# Patient Record
Sex: FEMALE | Race: WHITE | NOT HISPANIC OR LATINO | Employment: FULL TIME | URBAN - METROPOLITAN AREA
[De-identification: names, ages, dates, MRNs, and addresses within clinical notes are randomized per-mention and may not be internally consistent; named-entity substitution may affect disease eponyms.]

---

## 2017-06-05 VITALS
HEART RATE: 96 BPM | HEIGHT: 65 IN | BODY MASS INDEX: 23.46 KG/M2 | DIASTOLIC BLOOD PRESSURE: 62 MMHG | SYSTOLIC BLOOD PRESSURE: 110 MMHG | WEIGHT: 140.81 LBS

## 2017-06-05 RX ORDER — VIT C/E/ZN/COPPR/LUTEIN/ZEAXAN 250MG-90MG
1 CAPSULE ORAL DAILY
COMMUNITY
End: 2019-08-12

## 2017-06-05 RX ORDER — FLUTICASONE PROPIONATE 50 MCG
2 SPRAY, SUSPENSION (ML) NASAL DAILY
COMMUNITY
Start: 2017-03-23 | End: 2019-08-12 | Stop reason: SDUPTHER

## 2017-06-05 RX ORDER — INSULIN LISPRO 100 [IU]/ML
1 INJECTION, SOLUTION INTRAVENOUS; SUBCUTANEOUS DAILY
COMMUNITY
End: 2020-11-03 | Stop reason: SDUPTHER

## 2017-06-06 ENCOUNTER — OFFICE VISIT (OUTPATIENT)
Dept: FAMILY MEDICINE | Facility: CLINIC | Age: 32
End: 2017-06-06
Payer: COMMERCIAL

## 2017-06-06 VITALS
BODY MASS INDEX: 22.99 KG/M2 | HEART RATE: 71 BPM | OXYGEN SATURATION: 99 % | WEIGHT: 138 LBS | SYSTOLIC BLOOD PRESSURE: 130 MMHG | HEIGHT: 65 IN | DIASTOLIC BLOOD PRESSURE: 70 MMHG

## 2017-06-06 DIAGNOSIS — M77.8 SHOULDER TENDONITIS, RIGHT: Primary | ICD-10-CM

## 2017-06-06 PROBLEM — E55.9 UNSPECIFIED VITAMIN D DEFICIENCY: Status: ACTIVE | Noted: 2017-06-06

## 2017-06-06 PROBLEM — Z78.9 NON-SMOKER: Status: ACTIVE | Noted: 2017-06-06

## 2017-06-06 PROCEDURE — 99213 OFFICE O/P EST LOW 20 MIN: CPT | Mod: ,,, | Performed by: NURSE PRACTITIONER

## 2017-06-06 NOTE — PROGRESS NOTES
Subjective:       Patient ID: Marah Auguste is a 31 y.o. female.    Chief Complaint: Shoulder Pain (right)    Shoulder Pain    The pain is present in the right shoulder. This is a recurrent problem. The current episode started more than 1 year ago (about 5 years ago; pt said that she had work up and did physical therapy.  States that she has been Ok since, but over the last couple of months the pain has become more severe and is present every day.). There has been no history of extremity trauma. The problem occurs daily. The problem has been gradually worsening. The quality of the pain is described as aching. The pain is at a severity of 4/10. The pain is moderate. Associated symptoms include a limited range of motion and stiffness. Pertinent negatives include no fever, headaches, inability to bear weight, itching, joint locking, joint swelling, numbness, tingling or visual symptoms. She has tried nothing for the symptoms. Her past medical history is significant for diabetes. There is no history of Injuries to Extremity or migraines.       Review of Systems   Constitutional: Negative for activity change, appetite change, chills, fatigue and fever.   HENT: Negative for congestion, rhinorrhea and sore throat.    Eyes: Negative for pain and visual disturbance.   Respiratory: Negative for cough and shortness of breath.    Cardiovascular: Negative for chest pain and palpitations.   Gastrointestinal: Negative for abdominal pain, constipation and diarrhea.   Endocrine: Negative for polydipsia, polyphagia and polyuria.   Genitourinary: Negative for dysuria, frequency and urgency.   Musculoskeletal: Positive for stiffness. Negative for arthralgias, gait problem and myalgias.   Skin: Negative for color change, itching, pallor and rash.   Allergic/Immunologic: Negative for immunocompromised state.   Neurological: Negative for dizziness, tingling, syncope, numbness and headaches.   Hematological: Negative for adenopathy.  "  Psychiatric/Behavioral: Negative for confusion, self-injury and suicidal ideas.        Past Surgical History:   Procedure Laterality Date    LASER ABLATION      TUBAL LIGATION         Family History   Problem Relation Age of Onset    COPD Mother     Heart disease Mother     Hypertension Mother     Headaches Mother     Depression Mother         Social History     Social History    Marital status: Single     Spouse name: N/A    Number of children: N/A    Years of education: N/A     Occupational History    data entery      Social History Main Topics    Smoking status: Former Smoker    Smokeless tobacco: None    Alcohol use No    Drug use: No    Sexual activity: Yes     Partners: Male     Birth control/ protection: None, See Surgical Hx     Other Topics Concern    None     Social History Narrative    None       Current Outpatient Prescriptions   Medication Sig Dispense Refill    cholecalciferol, vitamin D3, 1,000 unit capsule Take 1 capsule by mouth once daily.      fluticasone (FLONASE) 50 mcg/actuation nasal spray 2 sprays by Nasal route once daily.      insulin lispro (HUMALOG) 100 unit/mL injection Inject 1 mL into the skin once daily.       No current facility-administered medications for this visit.        Review of patient's allergies indicates:  No Known Allergies   Objective:   Blood pressure 130/70, pulse 71, height 5' 5" (1.651 m), weight 62.6 kg (138 lb), SpO2 99 %. Body mass index is 22.96 kg/m².       Physical Exam   Constitutional: She is oriented to person, place, and time. She appears well-developed and well-nourished.   HENT:   Head: Normocephalic and atraumatic.   Right Ear: External ear normal.   Left Ear: External ear normal.   Nose: Nose normal.   Mouth/Throat: Oropharynx is clear and moist.   Eyes: Conjunctivae and lids are normal. Pupils are equal, round, and reactive to light. No scleral icterus.   Neck: Normal range of motion. Neck supple. Carotid bruit is not present. No " thyromegaly present.   Cardiovascular: Normal rate, regular rhythm and normal heart sounds.    Pulmonary/Chest: Effort normal and breath sounds normal.   Abdominal: Soft. Bowel sounds are normal. There is no tenderness.   Musculoskeletal:        Right shoulder: She exhibits tenderness and crepitus. She exhibits no bony tenderness, no swelling and no effusion.        Left shoulder: Normal.   Lymphadenopathy:     She has no cervical adenopathy.   Neurological: She is alert and oriented to person, place, and time.   Skin: Skin is warm, dry and intact.   Psychiatric: She has a normal mood and affect. She expresses no suicidal plans.        Assessment:       1. Shoulder tendonitis, right        Plan:       Marah was seen today for shoulder pain.    Diagnoses and all orders for this visit:    Shoulder tendonitis, right  -     X-ray Shoulder 2 or More Views Right; Future      Physical therapy; Ibuprofen 800mg tid for 3-5 days with food then only daily prn; understanding voiced; MRI if no improvement with PT.

## 2017-06-06 NOTE — PATIENT INSTRUCTIONS
Exercises for Shoulder Flexibility: Wall Walk  Improving your flexibility can reduce pain. Stretching exercises also can help increase your range of pain-free motion. Breathe normally when you exercise. Use smooth, fluid movements.  Note: Follow any special instructions you are given. If you feel pain, stop the exercise. If the pain continues after stopping, call your healthcare provider:  · Stand with your shoulder about 2 feet from the wall.  · Raise your arm to shoulder level and gently walk your fingers up the wall as high as you can.  · Hold for a few seconds. Then walk your fingers back down.  · Repeat 3 times. Move closer to the wall as you repeat.  · Build up to holding each stretch for 30 seconds.  Caution: Do this stretch only if your healthcare provider recommends it. Dont do it when you are first injured.          Date Last Reviewed: 8/16/2015  © 9035-1747 [employer]. [address] [place] [address]. All rights reserved. This information is not intended as a substitute for professional medical care. Always follow your healthcare professional's instructions.        Shoulder and Upper Back Stretch  To start, stand tall with your ears, shoulders, and hips in line. Your feet should be slightly apart, positioned just under your hips. Focus your eyes directly in front of you.  this position for a few seconds before starting your exercise. This helps increase your awareness of proper posture.  Reach overhead and slightly back with both arms. Keep your shoulders and neck aligned and your elbows behind your shoulders:  · With your palms facing the ceiling, turn your fingers inward.  · Take a deep breath. Breathe out, and lower your elbows toward your buttocks. Hold for 5 seconds, then return to starting position.  · Repeat 3 times.       Date Last Reviewed: 8/16/2015  © 5140-7577 [employer]. [address] [place] [address]. All rights reserved. This  information is not intended as a substitute for professional medical care. Always follow your healthcare professional's instructions.        Understanding AC Joint Sprain    The AC (acromioclavicular) joint is where the shoulder blade (scapula) meets the collarbone (clavicle). The highest point of the shoulder blade is called the acromion. Strong tissues called ligaments connect the acromion to the collarbone, forming the AC joint.  An AC joint sprain occurs when an injury damages the ligaments in the AC joint.  What causes AC joint sprain?  Often an accident or injury forces the AC joint apart. This may include:  · Falling onto your shoulder  · Falling onto an outstretched hand  · Getting a direct blow to your shoulder  Symptoms of AC joint sprain  Symptoms can vary depending on how serious the injury is. They can include:  · Shoulder pain  · Shoulder that feels sore when touched  · Swelling  · Bruising  · Change in the shoulders shape  · Bulge above the shoulder  · Shoulder that appears to droop  · Collarbone that moves upward  · Limited movement in the shoulder  Treatment for AC joint sprain  Treatment will depend on how serious the strain is. It will also depend on whether you have damage to other parts of the shoulder. Treatment may include:  · Rest. This allows your shoulder to heal. You should avoid activities that stress the joint. This includes reaching overhead or sleeping on your shoulder.  · Sling. This protects the shoulder and holds the joint in a good position for healing.  · Cold packs. These help reduce swelling and relieve pain.  · Prescription or over-the-counter pain medicines. These help relieve pain and swelling.  · Arm and shoulder exercises. These help keep the shoulder joint mobile as it heals. They also help improve muscle strength around the joint.     When to call your healthcare provider  Call your healthcare provider right away if you have any of these:  · Fever of 100.4°F (38°C) or  higher, or as directed  · Symptoms that dont get better or get worse  · New symptoms   Date Last Reviewed: 3/10/2016  © 8342-2258 [employer], [employer]. [address] [place] [address], [place] [address]. All rights reserved. This information is not intended as a substitute for professional medical care. Always follow your healthcare professional's instructions.

## 2017-07-12 ENCOUNTER — CLINICAL SUPPORT (OUTPATIENT)
Dept: REHABILITATION | Facility: HOSPITAL | Age: 32
End: 2017-07-12
Attending: NURSE PRACTITIONER
Payer: COMMERCIAL

## 2017-07-12 DIAGNOSIS — M77.8 SHOULDER TENDINITIS, UNSPECIFIED LATERALITY: Primary | ICD-10-CM

## 2017-07-12 PROCEDURE — 97161 PT EVAL LOW COMPLEX 20 MIN: CPT | Mod: PN

## 2017-07-12 PROCEDURE — 97140 MANUAL THERAPY 1/> REGIONS: CPT | Mod: PN

## 2017-07-12 NOTE — PLAN OF CARE
TIME RECORD    Date: 07/12/2017    Start Time:  0818  Stop Time:  0908    PROCEDURES:    TIMED  Procedure Time Min.     Manual Therapy Start:  0856  Stop:  0908 12    Start:  Stop:     Start:  Stop:     Start:  Stop:          UNTIMED  Procedure Time Min.     Initial Evaluation Start:  0818  Stop:  0854   34    Start:  Stop:      Total Timed Minutes:  12  Total Timed Units:  1  Total Untimed Units:  1  Charges Billed/# of units:  2    OUTPATIENT PHYSICAL THERAPY   PATIENT EVALUATION    Onset Date: Chronic with flare up about 2-3 months ago.    Primary Diagnosis:   1. Shoulder tendinitis, unspecified laterality       Treatment Diagnosis: R shoulder dysfunction  Past Medical History:   Diagnosis Date    Eustachian tube dysfunction     Vitamin D deficiency      Precautions: Standard  Prior Therapy:  No   Medications: Marah Auguste has a current medication list which includes the following prescription(s): cholecalciferol (vitamin d3), fluticasone, and insulin lispro.  Nutrition:  Normal  History of Present Illness: Pt presents to PT with history of R shoulder dysfunction of several years duration.  She reports that she had her initial episode without known cause about 5 years ago.  Underwent various testing and was told there were 3 small tears which could have been congenital, wear and tear, or traumatic.  Was given exercises to do and symptoms improved significantly.  Was doing well until about 2-3 months ago.  Symptoms returned without known cause and have progressively worsened.  Went to MD who referred her to PT.    Prior Level of Function: Independent  Social History: Lives with  and children.  Performs housekeeping activities, cooking, grocery shopping.  Works full time at a bank.    Place of Residence (Steps/Adaptations): 1 story home .    Functional Deficits Leading to Referral/Nature of Injury: Difficulty with upper body dressing, sleep is disturbed, difficulty getting comfortable. Driving is  "uncomfortable when using R arm.  Intermittent difficulty with using computer mouse.  Difficulty with bathing/hair dressing.   Patient Therapy Goals: To be out of pain.     Subjective     Marah Kathleenhia states "Some days are better than others."    Pain:  Location: R shoulder, intermittently radiating occasionally into R upper arm or into R cervical region.    Description: Intermittent aching with occasional shooting pain.  "Sometimes it feels like it's stuck"  Activities Which Increase Pain: Sitting up straight, reaching with R UE, lifting, upper body dressing, driving, R sidelying   Activities Which Decrease Pain: activity modification, changing positions.   Pain Scale: 3/10 at best 3/10 now  6/10 at worst    Objective     Posture: Standing adequate lumbar lordosis, decreased thoracic kyphosis, R shoulder elevated.  Sitting: minimally reversed lumbar lordosis, increased thoracic kyphosis, moderate rounded shoulder and forward head posture.  R shoulder minimally elevated.    Palpation: Minimal tenderness R anterolateral shoulder but not specific.  Minimal crepitus with scapular scouring.    Sensation: No deficits reported this date.   DTRs:  Not applicable this date.   Range of Motion/Strength:   Shoulder  Right   Left  Pain/Dysfunction with Movement    AROM PROM MMT AROM PROM MMT    flexion  138*  148*  4+/5  WNL   4+/5  active-tight    extension  65*  70*  4+5  WNL   4+/5    abduction  138*  142*  4+/5  WNL   4+/5  active-tight, passive-painful   adduction  30*   40*   4+/5  WNL   4+/5  active-tight, passive-painful   Internal rotation  78*  80*  4+5  WNL    4+/5    ER at 90° abd  78*  80*  4+/5  WM;   4+/5  passive-painful   ER at 0° abd  60*  72*  4+/5     resisted-painful   Otherwise B UE grossly WNL and 4+/5.  Cervical WNL  Flexibility: Tightness B upper traps and levator scap  Gait: Without AD  Analysis: Unremarkable  Bed Mobility:Independent  Transfers: Independent  Special Tests: Joint play: decreased " anterior to posterior glide and superior to inferior glide of R GH joint.  Increased discomfort with AC spin on R.  Scapular mobility WFL with crepitus noted during scouring.    Other: DASH 70/120 = 58.3% disability   Treatment: Educated pt in role of PT and proposed POC.  Verbalized understanding and agreement.  Initiated scapular scouring to R scapulothoracic joint in prone and sidelying with gentle PROM to R shoulder. Pt demonstrated improved AROM following session.      Assessment       Initial Assessment (Pertinent finding, problem list and factors affecting outcome): Pt presents to PT with chronic R shoulder dysfunction that is interfering with her ability to participate in her roles at work, in performing her housework, in taking care of and playing with her children, in driving, completing grocery shopping, and in self care.  Problems include increased shoulder pain, decreased shoulder ROM, impaired posture, decreased scapular mobility, decreased joint play, and decreased upper quadrant flexibility.  She should benefit from skilled PT services to address these limitations in order to resume her roles.    Rehab Potiential: good    Short Term Goals (3 Weeks): 1) Facilitate improved joint play, 2) Facilitate improved upper quadrant flexibility, 3) Improve posture in sitting to adequate lumbar lordosis, normalized thoracic kyphosis, min rounded shoulder and forward head posture, 4) Pt will consistently perform upper body dressing without shoulder pain.   Long Term Goals (6 Weeks): 1) Pt will achieve full ROM of R shoulder in order to resume care and play activities with her children, 2) Pt will resume laying on her R side without discomfort in order to sleep without disturbance from shoulder pain, 3) Pt will consistently drive utilizing B UE without increased R shoulder discomfort, 4) Improve DASH to < 20% disability, 5) Pt will be independent in HEP.      Plan     Certification Period: 07/12/2017 to  08/23/2017  Recommended Treatment Plan: 2 times per week for 6 weeks: Group Therapy, Manual Therapy, Moist Heat/ Ice, Patient Education, Therapeutic Activites, Therapeutic Exercise and Other Therapeutic taping as needed.  Other Recommendations: Pt may also benefit from dry needling PRN for symptom management.       Therapist: Carmen Bautista, PT    I CERTIFY THE NEED FOR THESE SERVICES FURNISHED UNDER THIS PLAN OF TREATMENT AND WHILE UNDER MY CARE    Physician's comments: ________________________________________________________________________________________________________________________________________________      Physician's Name: ___________________________________

## 2017-07-19 ENCOUNTER — CLINICAL SUPPORT (OUTPATIENT)
Dept: REHABILITATION | Facility: HOSPITAL | Age: 32
End: 2017-07-19
Attending: NURSE PRACTITIONER
Payer: COMMERCIAL

## 2017-07-19 DIAGNOSIS — M77.8 SHOULDER TENDINITIS, UNSPECIFIED LATERALITY: ICD-10-CM

## 2017-07-19 PROCEDURE — 97110 THERAPEUTIC EXERCISES: CPT | Mod: PN

## 2017-07-19 NOTE — PROGRESS NOTES
"Name: Marah [facility] Number: [id number]  Date of Treatment: 07/19/2017   Diagnosis:   Encounter Diagnosis   Name Primary?    Shoulder tendinitis, unspecified laterality        Time in: 0805  Time Out: 0905  Total Treatment Time: 60        Subjective:    Marah reports mostly has stiffness in her shoulder.  No pain at present but discomfort.  Patient reports their pain to be 0/10 on a 0-10 scale with 0 being no pain and 10 being the worst pain imaginable.    Objective    Patient received individual therapy to increase strength, endurance, ROM, flexibility and posture with 0 patients with activities as follows:     Marah received therapeutic exercises to develop strength, endurance, ROM, flexibility and posture for 55 minutes including:     UBE: 5 min forward/ 5 min backward  Pulleys: 3 min  Shld Shurgs: x30  Shld Rolls: x30  Scap retraction: x30  Upper Trap Stretch: 3x30 sec B  Levator Stretch: 3x30 sec B  Upper Thoracic Ext Stretch: 3x30 sec  Pec Stretch: 3x30 sec        KT applied to R shoulder for forward shoulder:  KT tape anchored below acromion on R. R UE placed in adduction behind back and neck laterally flexed to L side.  15-25% tension applied to tails over the upper traps, Upper tail applied to occiput and lower tail applied to C3-C4 vertabrae.  0% tension applied at ends.  Additional piece of KT tape, "I" strip applied on anterior aspect of shoulder nader posterior aspect with paper off tension.  "I" strip anchored at inferior border of scapular with 50% tension towards spinal processes in downward motion.  0% tension applied at ends.    Marah received the following manual therapy techniques: Soft tissue Mobilization were applied to the: R Shoulder for 5 minutes.     Pt demo good understanding of the education provided. Marah demonstrated good return demonstration of activities.     Assessment:     Pt had slight increase in pain during PT. Had difficulty raises R UE but loosened after time.  Pt " will continue to benefit from skilled PT intervention. Medical Necessity is demonstrated by:  Continued inability to participate in vocational pursuits, Pain limits function of effected part for some activities, Unable to participate fully in daily activities and Weakness.    Patient is making good progress towards established goals.    Plan:  Continue with established Plan of Care towards PT goals.     Student PTA participated in direct patient care of this patient with 1:1 supervision of PTA.  I certify that I was present in the room directing the student in service delivery and guiding them using my skilled judgment. As the co-signing therapist I have reviewed the students documentation and am responsible for the treatment, assessment, and plan.       Ferny Howe, SPTA/Carrie Doss, PTA

## 2018-02-06 ENCOUNTER — OFFICE VISIT (OUTPATIENT)
Dept: URGENT CARE | Facility: CLINIC | Age: 33
End: 2018-02-06
Payer: COMMERCIAL

## 2018-02-06 VITALS
DIASTOLIC BLOOD PRESSURE: 90 MMHG | RESPIRATION RATE: 18 BRPM | SYSTOLIC BLOOD PRESSURE: 135 MMHG | WEIGHT: 138 LBS | HEART RATE: 75 BPM | HEIGHT: 65 IN | OXYGEN SATURATION: 97 % | TEMPERATURE: 100 F | BODY MASS INDEX: 22.99 KG/M2

## 2018-02-06 DIAGNOSIS — R68.89 FLU-LIKE SYMPTOMS: Primary | ICD-10-CM

## 2018-02-06 DIAGNOSIS — R05.9 COUGH: ICD-10-CM

## 2018-02-06 LAB
CTP QC/QA: YES
FLUAV AG NPH QL: NEGATIVE
FLUBV AG NPH QL: NEGATIVE

## 2018-02-06 PROCEDURE — 87804 INFLUENZA ASSAY W/OPTIC: CPT | Mod: 59,QW,S$GLB, | Performed by: INTERNAL MEDICINE

## 2018-02-06 PROCEDURE — 99203 OFFICE O/P NEW LOW 30 MIN: CPT | Mod: S$GLB,,, | Performed by: INTERNAL MEDICINE

## 2018-02-06 PROCEDURE — 3008F BODY MASS INDEX DOCD: CPT | Mod: S$GLB,,, | Performed by: INTERNAL MEDICINE

## 2018-02-06 NOTE — PROGRESS NOTES
"Subjective:       Patient ID: Marah Auguste is a 32 y.o. female.    Vitals:  height is 5' 5" (1.651 m) and weight is 62.6 kg (138 lb). Her temperature is 99.7 °F (37.6 °C). Her blood pressure is 135/90 (abnormal) and her pulse is 75. Her respiration is 18 and oxygen saturation is 97%.     Chief Complaint: Sinus Problem    Sinus Problem   This is a new problem. The current episode started yesterday. The problem is unchanged. There has been no fever. Associated symptoms include chills, congestion, coughing, ear pain, headaches and sinus pressure. Pertinent negatives include no hoarse voice, shortness of breath or sore throat. Past treatments include nothing.     Review of Systems   Constitution: Positive for chills and malaise/fatigue. Negative for fever.   HENT: Positive for congestion, ear pain and sinus pressure. Negative for hoarse voice and sore throat.    Eyes: Negative for discharge and redness.   Cardiovascular: Negative for chest pain, dyspnea on exertion and leg swelling.   Respiratory: Positive for cough. Negative for shortness of breath, sputum production and wheezing.    Musculoskeletal: Negative for myalgias.   Gastrointestinal: Positive for nausea. Negative for abdominal pain.   Neurological: Positive for headaches.       Objective:      Physical Exam   Constitutional: She is oriented to person, place, and time. She appears well-developed and well-nourished. She is cooperative.  Non-toxic appearance. She does not appear ill. No distress.   HENT:   Head: Normocephalic and atraumatic.   Right Ear: Hearing, tympanic membrane, external ear and ear canal normal.   Left Ear: Hearing, tympanic membrane, external ear and ear canal normal.   Nose: Nose normal. No mucosal edema, rhinorrhea or nasal deformity. No epistaxis. Right sinus exhibits no maxillary sinus tenderness and no frontal sinus tenderness. Left sinus exhibits no maxillary sinus tenderness and no frontal sinus tenderness.   Mouth/Throat: Uvula " is midline, oropharynx is clear and moist and mucous membranes are normal. No trismus in the jaw. Normal dentition. No uvula swelling. No posterior oropharyngeal erythema.   URI Fatigue Flu like Symptoms/Flu test negative   Eyes: Conjunctivae and lids are normal. No scleral icterus.   Sclera clear bilat   Neck: Trachea normal, full passive range of motion without pain and phonation normal. Neck supple.   Cardiovascular: Normal rate, regular rhythm, normal heart sounds, intact distal pulses and normal pulses.    Pulmonary/Chest: Effort normal and breath sounds normal. No respiratory distress. She has no wheezes. She has no rales. She exhibits no tenderness.   Dry Cough   Abdominal: Soft. Normal appearance and bowel sounds are normal. She exhibits no distension. There is no tenderness.   Musculoskeletal: Normal range of motion. She exhibits no edema or deformity.   Neurological: She is alert and oriented to person, place, and time. She exhibits normal muscle tone. Coordination normal.   Skin: Skin is warm, dry and intact. She is not diaphoretic. No pallor.   Psychiatric: She has a normal mood and affect. Her speech is normal and behavior is normal. Judgment and thought content normal. Cognition and memory are normal.   Nursing note and vitals reviewed.      Assessment:       1. Flu-like symptoms    2. Cough        Plan:         Flu-like symptoms  -     POCT Influenza A/B    Cough

## 2018-11-01 ENCOUNTER — OFFICE VISIT (OUTPATIENT)
Dept: URGENT CARE | Facility: CLINIC | Age: 33
End: 2018-11-01
Payer: COMMERCIAL

## 2018-11-01 VITALS
SYSTOLIC BLOOD PRESSURE: 150 MMHG | OXYGEN SATURATION: 98 % | BODY MASS INDEX: 27.09 KG/M2 | RESPIRATION RATE: 18 BRPM | HEART RATE: 64 BPM | DIASTOLIC BLOOD PRESSURE: 91 MMHG | HEIGHT: 60 IN | TEMPERATURE: 98 F | WEIGHT: 138 LBS

## 2018-11-01 DIAGNOSIS — H10.233: Primary | ICD-10-CM

## 2018-11-01 PROCEDURE — 3008F BODY MASS INDEX DOCD: CPT | Mod: CPTII,S$GLB,, | Performed by: PHYSICIAN ASSISTANT

## 2018-11-01 PROCEDURE — 99214 OFFICE O/P EST MOD 30 MIN: CPT | Mod: S$GLB,,, | Performed by: PHYSICIAN ASSISTANT

## 2018-11-01 RX ORDER — INSULIN GLARGINE 100 [IU]/ML
35 INJECTION, SOLUTION SUBCUTANEOUS DAILY
Refills: 6 | COMMUNITY
Start: 2018-10-21 | End: 2020-11-03 | Stop reason: SDUPTHER

## 2018-11-01 NOTE — PATIENT INSTRUCTIONS
Conjunctivitis, Nonspecific    The membrane that covers the white part of your eye (the conjunctiva) is inflamed. Inflammation happens when your body responds to an injury, allergic reaction, infection, or illness. Symptoms of inflammation in the eye may include redness, irritation, itching, swelling, or burning. These symptoms should go away within the next 24 hours. Conjunctivitis may be related to a particle that was in your eye. If so, it may wash out with your tears or irrigation treatment. Being exposed to liquid chemicals or fumes may also cause this reaction.   Home care  · Apply a cold pack (ice in a plastic bag, wrapped in a towel) over the eye for 20 minutes at a time. This will reduce pain.  · Artificial tears may be prescribed to reduce irritation or redness.  These should be used 3 to 4 times a day.  · You may use acetaminophen or ibuprofen to control pain, unless another medicine was prescribed.(Note: If you have chronic liver or kidney disease, or if you have ever had a stomach ulcer or gastrointestinal bleeding, talk with your healthcare provider before using these medicines.)  Follow-up care  Follow up with your healthcare provider, or as advised.  When to seek medical advice  Call your healthcare provider right away if any of these occur:  · Increased eyelid swelling  · Increased eye pain  · Increased redness or drainage from the eye  · Increased blurry vision or increased sensitivity to light  · Failure of normal vision to return within 24 to 48 hours  Date Last Reviewed: 6/14/2015  © 2415-2878 [employer]. [address] [place] [address], [place] [id number]. All rights reserved. This information is not intended as a substitute for professional medical care. Always follow your healthcare professional's instructions.    If you were prescribed a narcotic medication, do not drive or operate heavy equipment or machinery while taking these medications.  You must understand that you've received  an Urgent Care treatment only and that you may be released before all your medical problems are known or treated. You, the patient, will arrange for follow up care as instructed.  Follow up with your PCP or specialty clinic as directed in the next 1-2 weeks if not improved or as needed.  You can call [phone] to schedule an appointment with the appropriate provider.  If your condition worsens we recommend that you receive another evaluation at the emergency room immediately or contact your primary medical clinics after hours call service to discuss your concerns.  Please return here or go to the Emergency Department for any concerns or worsening of condition.

## 2018-11-01 NOTE — PROGRESS NOTES
Subjective:       Patient ID: Marah Auguste is a 33 y.o. female.    Vitals:  height is 5' (1.524 m) and weight is 62.6 kg (138 lb). Her oral temperature is 97.7 °F (36.5 °C). Her blood pressure is 150/91 (abnormal) and her pulse is 64. Her respiration is 18 and oxygen saturation is 98%.     Chief Complaint: Eye Problem    Pt states right eye had crust and today left is itching      Eye Problem    Both eyes are affected.This is a new problem. The current episode started yesterday. The problem occurs constantly. The problem has been gradually worsening. There is no known exposure to pink eye. She does not wear contacts. Associated symptoms include eye redness and itching. Pertinent negatives include no blurred vision, fever, nausea, photophobia or vomiting. She has tried nothing for the symptoms. The treatment provided no relief.     Review of Systems   Constitution: Negative for chills and fever.   HENT: Negative for congestion.    Eyes: Positive for itching and redness. Negative for blurred vision, pain and photophobia.   Gastrointestinal: Negative for nausea and vomiting.   Neurological: Negative for headaches.       Objective:      Physical Exam   Constitutional: She is oriented to person, place, and time. She appears well-developed and well-nourished.   HENT:   Head: Normocephalic and atraumatic.   Right Ear: External ear normal.   Left Ear: External ear normal.   Nose: Nose normal.   Mouth/Throat: Oropharynx is clear and moist.   Eyes: EOM and lids are normal. Pupils are equal, round, and reactive to light. Right eye exhibits no discharge and no exudate. No foreign body present in the right eye. Left eye exhibits no discharge and no exudate. No foreign body present in the left eye. Right conjunctiva is injected. Left conjunctiva is injected.   Neck: Trachea normal, full passive range of motion without pain and phonation normal. Neck supple.   Musculoskeletal: Normal range of motion.   Neurological: She is  alert and oriented to person, place, and time.   Skin: Skin is warm, dry and intact.   Psychiatric: She has a normal mood and affect. Her speech is normal and behavior is normal. Judgment and thought content normal. Cognition and memory are normal.   Nursing note and vitals reviewed.      Assessment:       1. Conjunctivitis, serous non-viral, bilateral        Plan:         Conjunctivitis, serous non-viral, bilateral    declines steroid IM considering DM.   Will use OTC natural tears and RTC if symptoms persist or worsen.     Patient Instructions     Conjunctivitis, Nonspecific    The membrane that covers the white part of your eye (the conjunctiva) is inflamed. Inflammation happens when your body responds to an injury, allergic reaction, infection, or illness. Symptoms of inflammation in the eye may include redness, irritation, itching, swelling, or burning. These symptoms should go away within the next 24 hours. Conjunctivitis may be related to a particle that was in your eye. If so, it may wash out with your tears or irrigation treatment. Being exposed to liquid chemicals or fumes may also cause this reaction.   Home care  · Apply a cold pack (ice in a plastic bag, wrapped in a towel) over the eye for 20 minutes at a time. This will reduce pain.  · Artificial tears may be prescribed to reduce irritation or redness.  These should be used 3 to 4 times a day.  · You may use acetaminophen or ibuprofen to control pain, unless another medicine was prescribed.(Note: If you have chronic liver or kidney disease, or if you have ever had a stomach ulcer or gastrointestinal bleeding, talk with your healthcare provider before using these medicines.)  Follow-up care  Follow up with your healthcare provider, or as advised.  When to seek medical advice  Call your healthcare provider right away if any of these occur:  · Increased eyelid swelling  · Increased eye pain  · Increased redness or drainage from the eye  · Increased  blurry vision or increased sensitivity to light  · Failure of normal vision to return within 24 to 48 hours  Date Last Reviewed: 6/14/2015  © 5096-0138 [employer]. [address] [place] [address], [place] [address]. All rights reserved. This information is not intended as a substitute for professional medical care. Always follow your healthcare professional's instructions.    If you were prescribed a narcotic medication, do not drive or operate heavy equipment or machinery while taking these medications.  You must understand that you've received an Urgent Care treatment only and that you may be released before all your medical problems are known or treated. You, the patient, will arrange for follow up care as instructed.  Follow up with your PCP or specialty clinic as directed in the next 1-2 weeks if not improved or as needed.  You can call [phone] to schedule an appointment with the appropriate provider.  If your condition worsens we recommend that you receive another evaluation at the emergency room immediately or contact your primary medical clinics after hours call service to discuss your concerns.  Please return here or go to the Emergency Department for any concerns or worsening of condition.

## 2018-11-01 NOTE — LETTER
November 1, 2018      [facility] [place] [address], [address]  [facility] [address]  Phone: [phone]  Fax: [phone]       Patient: Marah Auguste   YOB: 1985  Date of Visit: 11/01/2018    To Whom It May Concern:    Delgado Auguste  was at [facility] on 11/01/2018.Please excuse for work/school for 3 days unless well enough to return sooner. If you have any questions or concerns, or if I can be of further assistance, please do not hesitate to contact me.    Sincerely,    Herb Nguyen PA-C

## 2018-11-04 ENCOUNTER — TELEPHONE (OUTPATIENT)
Dept: URGENT CARE | Facility: CLINIC | Age: 33
End: 2018-11-04

## 2019-05-15 ENCOUNTER — HOSPITAL ENCOUNTER (OUTPATIENT)
Dept: RADIOLOGY | Facility: HOSPITAL | Age: 34
Discharge: HOME | End: 2019-05-15
Attending: FAMILY MEDICINE
Payer: COMMERCIAL

## 2019-05-15 ENCOUNTER — OFFICE VISIT (OUTPATIENT)
Dept: URGENT CARE | Facility: CLINIC | Age: 34
End: 2019-05-15
Payer: COMMERCIAL

## 2019-05-15 ENCOUNTER — TELEPHONE (OUTPATIENT)
Dept: URGENT CARE | Facility: CLINIC | Age: 34
End: 2019-05-15

## 2019-05-15 VITALS
TEMPERATURE: 99 F | SYSTOLIC BLOOD PRESSURE: 111 MMHG | HEART RATE: 68 BPM | DIASTOLIC BLOOD PRESSURE: 68 MMHG | BODY MASS INDEX: 23.32 KG/M2 | RESPIRATION RATE: 18 BRPM | WEIGHT: 140 LBS | OXYGEN SATURATION: 100 % | HEIGHT: 65 IN

## 2019-05-15 DIAGNOSIS — R10.30 LOWER ABDOMINAL PAIN: Primary | ICD-10-CM

## 2019-05-15 DIAGNOSIS — R10.30 LOWER ABDOMINAL PAIN: ICD-10-CM

## 2019-05-15 LAB
BILIRUB UR QL STRIP: NEGATIVE
GLUCOSE UR QL STRIP: POSITIVE
KETONES UR QL STRIP: NEGATIVE
LEUKOCYTE ESTERASE UR QL STRIP: NEGATIVE
PH, POC UA: 7.5 (ref 5–8)
POC BLOOD, URINE: NEGATIVE
POC NITRATES, URINE: NEGATIVE
PROT UR QL STRIP: NEGATIVE
SP GR UR STRIP: 1.01 (ref 1–1.03)
UROBILINOGEN UR STRIP-ACNC: NORMAL (ref 0.1–1.1)

## 2019-05-15 PROCEDURE — 76857 US PELIVS LIMITED NON OB: ICD-10-PCS | Mod: 26,,, | Performed by: RADIOLOGY

## 2019-05-15 PROCEDURE — 99214 PR OFFICE/OUTPT VISIT, EST, LEVL IV, 30-39 MIN: ICD-10-PCS | Mod: 25,S$GLB,, | Performed by: FAMILY MEDICINE

## 2019-05-15 PROCEDURE — 99214 OFFICE O/P EST MOD 30 MIN: CPT | Mod: 25,S$GLB,, | Performed by: FAMILY MEDICINE

## 2019-05-15 PROCEDURE — 3008F BODY MASS INDEX DOCD: CPT | Mod: CPTII,S$GLB,, | Performed by: FAMILY MEDICINE

## 2019-05-15 PROCEDURE — 81003 POCT URINALYSIS, DIPSTICK, AUTOMATED, W/O SCOPE: ICD-10-PCS | Mod: QW,S$GLB,, | Performed by: FAMILY MEDICINE

## 2019-05-15 PROCEDURE — 76857 US EXAM PELVIC LIMITED: CPT | Mod: 26,,, | Performed by: RADIOLOGY

## 2019-05-15 PROCEDURE — 3008F PR BODY MASS INDEX (BMI) DOCUMENTED: ICD-10-PCS | Mod: CPTII,S$GLB,, | Performed by: FAMILY MEDICINE

## 2019-05-15 PROCEDURE — 76857 US EXAM PELVIC LIMITED: CPT | Mod: TC

## 2019-05-15 PROCEDURE — 81003 URINALYSIS AUTO W/O SCOPE: CPT | Mod: QW,S$GLB,, | Performed by: FAMILY MEDICINE

## 2019-05-15 RX ORDER — DICYCLOMINE HYDROCHLORIDE 10 MG/1
10 CAPSULE ORAL 2 TIMES DAILY
Qty: 30 CAPSULE | Refills: 0 | Status: SHIPPED | OUTPATIENT
Start: 2019-05-15 | End: 2019-06-14

## 2019-05-15 NOTE — PROGRESS NOTES
"Subjective:       Patient ID: Marah Auguste is a 33 y.o. female.    Vitals:  height is 5' 5" (1.651 m) and weight is 63.5 kg (140 lb). Her oral temperature is 99.4 °F (37.4 °C). Her blood pressure is 111/68 and her pulse is 68. Her respiration is 18 and oxygen saturation is 100%.     Chief Complaint: Bladder Pain    Pressure pain below pelvis area x 2 hours. Patient states pain is tolerable at this moment.    Abdominal Pain   This is a new problem. The current episode started today. The onset quality is sudden. The problem occurs intermittently. The most recent episode lasted 2 hours. The problem has been gradually improving. The pain is located in the LLQ, RLQ and suprapubic region. The pain is at a severity of 3/10. The quality of the pain is dull. The abdominal pain radiates to the LLQ, RLQ and pelvis. Associated symptoms include headaches and nausea. Pertinent negatives include no dysuria, fever, frequency, hematuria or vomiting. The pain is relieved by nothing. She has tried nothing for the symptoms.       Constitution: Positive for chills and sweating. Negative for fever.   Neck: Negative for painful lymph nodes.   Gastrointestinal: Positive for abdominal pain and nausea. Negative for vomiting.   Genitourinary: Negative for dysuria, frequency, urgency, urine decreased, hematuria, history of kidney stones, painful menstruation, irregular menstruation, missed menses, heavy menstrual bleeding, ovarian cysts, genital trauma, vaginal pain, vaginal discharge, vaginal bleeding, vaginal odor, painful intercourse, genital sore, painful ejaculation and pelvic pain.   Musculoskeletal: Positive for back pain.   Skin: Negative for rash and lesion.   Neurological: Positive for headaches.   Hematologic/Lymphatic: Negative for swollen lymph nodes.       Objective:      Physical Exam   Constitutional: She is oriented to person, place, and time. She appears well-developed and well-nourished.   HENT:   Head: Normocephalic " and atraumatic.   Right Ear: External ear normal.   Left Ear: External ear normal.   Nose: Nose normal.   Mouth/Throat: Mucous membranes are normal.   Eyes: Conjunctivae and lids are normal.   Neck: Trachea normal and full passive range of motion without pain. Neck supple.   Cardiovascular: Normal rate, regular rhythm and normal heart sounds.   Pulmonary/Chest: Effort normal and breath sounds normal. No respiratory distress.   Abdominal: Soft. Normal appearance and bowel sounds are normal. She exhibits no distension, no abdominal bruit, no pulsatile midline mass and no mass. There is tenderness. There is rebound.       Musculoskeletal: Normal range of motion. She exhibits no edema.   Neurological: She is alert and oriented to person, place, and time. She has normal strength.   Skin: Skin is warm, dry and intact. She is not diaphoretic. No pallor.   Psychiatric: She has a normal mood and affect. Her speech is normal and behavior is normal. Judgment and thought content normal. Cognition and memory are normal.   Nursing note and vitals reviewed.      Assessment:       1. Lower abdominal pain        Plan:         Lower abdominal pain  -     US Abdomen Limited; Future; Expected date: 05/15/2019  -     POCT Urinalysis, Dipstick, Automated, W/O Scope    Other orders  -     dicyclomine (BENTYL) 10 MG capsule; Take 1 capsule (10 mg total) by mouth 2 (two) times daily.  Dispense: 30 capsule; Refill: 0        advised ER precautions. Pt v/u. US at 445 today at [facility]

## 2019-08-12 PROBLEM — R05.9 COUGH: Status: RESOLVED | Noted: 2018-02-06 | Resolved: 2019-08-12

## 2019-08-12 PROBLEM — Z78.9 NON-SMOKER: Status: RESOLVED | Noted: 2017-06-06 | Resolved: 2019-08-12

## 2019-08-12 PROBLEM — R68.89 FLU-LIKE SYMPTOMS: Status: RESOLVED | Noted: 2018-02-06 | Resolved: 2019-08-12

## 2019-10-15 ENCOUNTER — OFFICE VISIT (OUTPATIENT)
Dept: URGENT CARE | Facility: CLINIC | Age: 34
End: 2019-10-15
Payer: COMMERCIAL

## 2019-10-15 VITALS
HEIGHT: 65 IN | DIASTOLIC BLOOD PRESSURE: 88 MMHG | OXYGEN SATURATION: 100 % | WEIGHT: 142 LBS | TEMPERATURE: 98 F | HEART RATE: 69 BPM | SYSTOLIC BLOOD PRESSURE: 142 MMHG | RESPIRATION RATE: 18 BRPM | BODY MASS INDEX: 23.66 KG/M2

## 2019-10-15 DIAGNOSIS — J32.9 CLINICAL SINUSITIS: Primary | ICD-10-CM

## 2019-10-15 PROCEDURE — 3008F PR BODY MASS INDEX (BMI) DOCUMENTED: ICD-10-PCS | Mod: CPTII,S$GLB,, | Performed by: FAMILY MEDICINE

## 2019-10-15 PROCEDURE — 96372 THER/PROPH/DIAG INJ SC/IM: CPT | Mod: S$GLB,,, | Performed by: FAMILY MEDICINE

## 2019-10-15 PROCEDURE — 3008F BODY MASS INDEX DOCD: CPT | Mod: CPTII,S$GLB,, | Performed by: FAMILY MEDICINE

## 2019-10-15 PROCEDURE — 96372 PR INJECTION,THERAP/PROPH/DIAG2ST, IM OR SUBCUT: ICD-10-PCS | Mod: S$GLB,,, | Performed by: FAMILY MEDICINE

## 2019-10-15 PROCEDURE — 99214 PR OFFICE/OUTPT VISIT, EST, LEVL IV, 30-39 MIN: ICD-10-PCS | Mod: 25,S$GLB,, | Performed by: FAMILY MEDICINE

## 2019-10-15 PROCEDURE — 99214 OFFICE O/P EST MOD 30 MIN: CPT | Mod: 25,S$GLB,, | Performed by: FAMILY MEDICINE

## 2019-10-15 RX ORDER — BETAMETHASONE SODIUM PHOSPHATE AND BETAMETHASONE ACETATE 3; 3 MG/ML; MG/ML
6 INJECTION, SUSPENSION INTRA-ARTICULAR; INTRALESIONAL; INTRAMUSCULAR; SOFT TISSUE ONCE
Status: COMPLETED | OUTPATIENT
Start: 2019-10-15 | End: 2019-10-15

## 2019-10-15 RX ORDER — CEFDINIR 300 MG/1
300 CAPSULE ORAL 2 TIMES DAILY
Qty: 20 CAPSULE | Refills: 0 | Status: SHIPPED | OUTPATIENT
Start: 2019-10-15 | End: 2019-10-25

## 2019-10-15 RX ORDER — AZELASTINE 1 MG/ML
1 SPRAY, METERED NASAL 2 TIMES DAILY
Qty: 30 ML | Refills: 3 | Status: SHIPPED | OUTPATIENT
Start: 2019-10-15 | End: 2020-07-07

## 2019-10-15 RX ORDER — FLUTICASONE PROPIONATE 50 MCG
1 SPRAY, SUSPENSION (ML) NASAL 2 TIMES DAILY
Qty: 1 BOTTLE | Refills: 2 | Status: SHIPPED | OUTPATIENT
Start: 2019-10-15 | End: 2020-07-07

## 2019-10-15 RX ADMIN — BETAMETHASONE SODIUM PHOSPHATE AND BETAMETHASONE ACETATE 6 MG: 3; 3 INJECTION, SUSPENSION INTRA-ARTICULAR; INTRALESIONAL; INTRAMUSCULAR; SOFT TISSUE at 09:10

## 2019-10-15 NOTE — PROGRESS NOTES
"Subjective:       Patient ID: Marah Robert is a 34 y.o. female.    Vitals:  height is 5' 5" (1.651 m) and weight is 64.4 kg (142 lb). Her oral temperature is 98.3 °F (36.8 °C). Her blood pressure is 142/88 (abnormal) and her pulse is 69. Her respiration is 18 and oxygen saturation is 100%.     Chief Complaint: Sinus Problem    Post nasal drip x 1 month. Patient states it has her ears plugged up now. Patient states she has a little cough which feels like it is in her chest.    Sinus Problem   This is a new problem. The current episode started 1 to 4 weeks ago. The problem has been waxing and waning since onset. Associated symptoms include congestion, coughing, ear pain, headaches, sinus pressure and a sore throat. Pertinent negatives include no chills, diaphoresis or shortness of breath. Past treatments include acetaminophen (mucinex amd angelia selzer).       Constitution: Negative for chills, sweating, fatigue and fever.   HENT: Positive for ear pain, congestion, postnasal drip, sinus pain, sinus pressure and sore throat. Negative for voice change.    Neck: Negative for painful lymph nodes.   Eyes: Negative for eye redness.   Respiratory: Positive for cough. Negative for chest tightness, sputum production, bloody sputum, COPD, shortness of breath, stridor, wheezing and asthma.    Gastrointestinal: Negative for nausea and vomiting.   Musculoskeletal: Negative for muscle ache.   Skin: Negative for rash.   Allergic/Immunologic: Negative for seasonal allergies and asthma.   Neurological: Positive for headaches.   Hematologic/Lymphatic: Negative for swollen lymph nodes.       Objective:      Physical Exam   Constitutional: She is oriented to person, place, and time. She appears well-developed and well-nourished. She is cooperative.  Non-toxic appearance. She does not have a sickly appearance. She does not appear ill. No distress.   HENT:   Head: Normocephalic and atraumatic.   Right Ear: Hearing, tympanic membrane, " external ear and ear canal normal.   Left Ear: Hearing, tympanic membrane, external ear and ear canal normal.   Nose: Mucosal edema and rhinorrhea present. No nasal deformity. No epistaxis. Right sinus exhibits maxillary sinus tenderness. Right sinus exhibits no frontal sinus tenderness. Left sinus exhibits maxillary sinus tenderness. Left sinus exhibits no frontal sinus tenderness.   Mouth/Throat: Uvula is midline, oropharynx is clear and moist and mucous membranes are normal. No trismus in the jaw. Normal dentition. No uvula swelling. No oropharyngeal exudate, posterior oropharyngeal edema or posterior oropharyngeal erythema.   Eyes: Conjunctivae and lids are normal. No scleral icterus.   Neck: Trachea normal, full passive range of motion without pain and phonation normal. Neck supple. No neck rigidity. No edema and no erythema present.   Cardiovascular: Normal rate, regular rhythm, normal heart sounds, intact distal pulses and normal pulses.   Pulmonary/Chest: Effort normal and breath sounds normal. No respiratory distress. She has no decreased breath sounds. She has no rhonchi.   Abdominal: Normal appearance.   Musculoskeletal: Normal range of motion. She exhibits no edema or deformity.   Neurological: She is alert and oriented to person, place, and time. She exhibits normal muscle tone. Coordination normal.   Skin: Skin is warm, dry, intact, not diaphoretic and not pale.   Psychiatric: She has a normal mood and affect. Her speech is normal and behavior is normal. Judgment and thought content normal. Cognition and memory are normal.   Nursing note and vitals reviewed.        Assessment:       No diagnosis found.    Plan:         There are no diagnoses linked to this encounter.

## 2020-05-19 ENCOUNTER — TELEPHONE (OUTPATIENT)
Dept: URGENT CARE | Facility: CLINIC | Age: 35
End: 2020-05-19

## 2020-05-19 NOTE — TELEPHONE ENCOUNTER
Saw patient on [facility]. Called to check on her to see if she is improving with Lamisil cream. No answer, left voicemail.    Abi Menchaca PA-C

## 2020-05-19 NOTE — TELEPHONE ENCOUNTER
----- Message from Leisa Alcaraz MD sent at 5/19/2020  1:37 PM CDT -----  I looked at the [facility] chart - not sure this is simple ring worm with >4 lesions and spread apart- consider nummular eczema, or psoriatic rash - perhaps call patient and check and have her see derm

## 2020-07-07 PROBLEM — E10.65 UNCONTROLLED TYPE 1 DIABETES MELLITUS WITH HYPERGLYCEMIA: Status: ACTIVE | Noted: 2020-07-07

## 2020-07-16 ENCOUNTER — LAB VISIT (OUTPATIENT)
Dept: PRIMARY CARE CLINIC | Facility: OTHER | Age: 35
End: 2020-07-16
Attending: INTERNAL MEDICINE
Payer: COMMERCIAL

## 2020-07-16 DIAGNOSIS — R05.9 COUGH: ICD-10-CM

## 2020-07-16 DIAGNOSIS — Z03.818 ENCOUNTER FOR OBSERVATION FOR SUSPECTED EXPOSURE TO OTHER BIOLOGICAL AGENTS RULED OUT: ICD-10-CM

## 2020-07-16 PROCEDURE — U0003 INFECTIOUS AGENT DETECTION BY NUCLEIC ACID (DNA OR RNA); SEVERE ACUTE RESPIRATORY SYNDROME CORONAVIRUS 2 (SARS-COV-2) (CORONAVIRUS DISEASE [COVID-19]), AMPLIFIED PROBE TECHNIQUE, MAKING USE OF HIGH THROUGHPUT TECHNOLOGIES AS DESCRIBED BY CMS-2020-01-R: HCPCS | Mod: ST72

## 2020-07-20 LAB — SARS-COV-2 RNA RESP QL NAA+PROBE: NEGATIVE

## 2020-10-15 ENCOUNTER — OFFICE VISIT (OUTPATIENT)
Dept: FAMILY MEDICINE | Facility: CLINIC | Age: 35
End: 2020-10-15
Payer: COMMERCIAL

## 2020-10-15 ENCOUNTER — HOSPITAL ENCOUNTER (OUTPATIENT)
Dept: RADIOLOGY | Facility: HOSPITAL | Age: 35
Discharge: HOME | End: 2020-10-15
Attending: NURSE PRACTITIONER
Payer: COMMERCIAL

## 2020-10-15 VITALS
WEIGHT: 143 LBS | HEART RATE: 72 BPM | DIASTOLIC BLOOD PRESSURE: 74 MMHG | HEIGHT: 63 IN | BODY MASS INDEX: 25.34 KG/M2 | TEMPERATURE: 99 F | SYSTOLIC BLOOD PRESSURE: 118 MMHG

## 2020-10-15 DIAGNOSIS — E10.65 UNCONTROLLED TYPE 1 DIABETES MELLITUS WITH HYPERGLYCEMIA: Primary | ICD-10-CM

## 2020-10-15 DIAGNOSIS — M25.559 HIP PAIN: ICD-10-CM

## 2020-10-15 DIAGNOSIS — F41.9 ANXIETY: ICD-10-CM

## 2020-10-15 DIAGNOSIS — F32.A DEPRESSION, UNSPECIFIED DEPRESSION TYPE: ICD-10-CM

## 2020-10-15 DIAGNOSIS — E55.9 VITAMIN D DEFICIENCY: ICD-10-CM

## 2020-10-15 DIAGNOSIS — G47.00 INSOMNIA, UNSPECIFIED TYPE: ICD-10-CM

## 2020-10-15 PROCEDURE — 73502 X-RAY EXAM HIP UNI 2-3 VIEWS: CPT | Mod: TC,PO,RT

## 2020-10-15 PROCEDURE — 3052F HG A1C>EQUAL 8.0%<EQUAL 9.0%: CPT | Mod: S$GLB,,, | Performed by: NURSE PRACTITIONER

## 2020-10-15 PROCEDURE — 3008F PR BODY MASS INDEX (BMI) DOCUMENTED: ICD-10-PCS | Mod: S$GLB,,, | Performed by: NURSE PRACTITIONER

## 2020-10-15 PROCEDURE — 3052F PR MOST RECENT HEMOGLOBIN A1C LEVEL 8.0 - < 9.0%: ICD-10-PCS | Mod: S$GLB,,, | Performed by: NURSE PRACTITIONER

## 2020-10-15 PROCEDURE — 99204 OFFICE O/P NEW MOD 45 MIN: CPT | Mod: S$GLB,,, | Performed by: NURSE PRACTITIONER

## 2020-10-15 PROCEDURE — 99204 PR OFFICE/OUTPT VISIT, NEW, LEVL IV, 45-59 MIN: ICD-10-PCS | Mod: S$GLB,,, | Performed by: NURSE PRACTITIONER

## 2020-10-15 PROCEDURE — 83036 HEMOGLOBIN GLYCOSYLATED A1C: CPT | Mod: QW,,, | Performed by: NURSE PRACTITIONER

## 2020-10-15 PROCEDURE — 83036 POCT HEMOGLOBIN A1C: ICD-10-PCS | Mod: QW,,, | Performed by: NURSE PRACTITIONER

## 2020-10-15 PROCEDURE — 3008F BODY MASS INDEX DOCD: CPT | Mod: S$GLB,,, | Performed by: NURSE PRACTITIONER

## 2020-10-15 RX ORDER — TEMAZEPAM 15 MG/1
15 CAPSULE ORAL NIGHTLY PRN
Qty: 30 CAPSULE | Refills: 1 | Status: SHIPPED | OUTPATIENT
Start: 2020-10-15 | End: 2020-11-14

## 2020-10-15 RX ORDER — ESCITALOPRAM OXALATE 10 MG/1
10 TABLET ORAL DAILY
Qty: 30 TABLET | Refills: 11 | Status: SHIPPED | OUTPATIENT
Start: 2020-10-15 | End: 2020-11-11

## 2020-10-15 RX ORDER — DESOGESTREL AND ETHINYL ESTRADIOL 0.15-0.03
KIT ORAL
COMMUNITY
Start: 2020-10-14 | End: 2023-01-30

## 2020-10-15 NOTE — PROGRESS NOTES
SUBJECTIVE:    Patient ID: Marah Robert is a 35 y.o. female.    Chief Complaint: Establish Care (brought bottles//refused flu shot tb )    35 year old female presents for check up and to establish care. She was diagnosed with type 1 diabetes around the age of 23. Does not watch diet. Sugars are up and down. Previously had pump but not able to afford. since insurance as changed. .    States under care Dr Ramos endocrinologist. Used to have Insulin pump but now using Basalgar 35 U and Regular insulin on SSI  No recent labs. Does not sleep well. Currently seeing therapist. Lots of stressors.   Pap smear is up to date.  Reviewed medications list.  Has been having right hip pain. Denies injury or trauma      Office Visit on 10/15/2020   Component Date Value Ref Range Status    Hemoglobin A1C 10/16/2020 8.5  % Final   Lab Visit on 07/16/2020   Component Date Value Ref Range Status    SARS-CoV-2 RNA, Amplification, Qual 07/16/2020 Negative   Final   Lab Visit on 07/08/2020   Component Date Value Ref Range Status    SARS-CoV2 (COVID-19) Qualitative P* 07/08/2020 Not Detected   Final       Past Medical History:   Diagnosis Date    Diabetes mellitus type I     Eustachian tube dysfunction     Vitamin D deficiency      Social History     Socioeconomic History    Marital status:      Spouse name: Not on file    Number of children: Not on file    Years of education: Not on file    Highest education level: Not on file   Occupational History    Occupation: data entery   Social Needs    Financial resource strain: Not on file    Food insecurity     Worry: Not on file     Inability: Not on file    Transportation needs     Medical: Not on file     Non-medical: Not on file   Tobacco Use    Smoking status: Former Smoker    Smokeless tobacco: Never Used   Substance and Sexual Activity    Alcohol use: No    Drug use: No    Sexual activity: Yes     Partners: Male     Birth control/protection: None, See  Surgical Hx   Lifestyle    Physical activity     Days per week: Not on file     Minutes per session: Not on file    Stress: Not on file   Relationships    Social connections     Talks on phone: Not on file     Gets together: Not on file     Attends Sabianist service: Not on file     Active member of club or organization: Not on file     Attends meetings of clubs or organizations: Not on file     Relationship status: Not on file   Other Topics Concern    Not on file   Social History Narrative    Not on file     Past Surgical History:   Procedure Laterality Date    ABLATION      LASER ABLATION      TUBAL LIGATION       Family History   Problem Relation Age of Onset    COPD Mother     Heart disease Mother     Hypertension Mother     Headaches Mother     Depression Mother     No Known Problems Father        Review of patient's allergies indicates:   Allergen Reactions    Lisinopril        Current Outpatient Medications:     BASAGLAR KWIKPEN U-100 INSULIN 100 unit/mL (3 mL) InPn pen, Inject 35 Units into the skin once daily., Disp: , Rfl: 6    ENSKYCE 0.15-0.03 mg per tablet, , Disp: , Rfl:     insulin lispro (HUMALOG) 100 unit/mL injection, Inject 1 mL into the skin once daily., Disp: , Rfl:     ONETOUCH ULTRA BLUE TEST STRIP Strp, TEST 4 TIMES DAILY, Disp: , Rfl:     escitalopram oxalate (LEXAPRO) 10 MG tablet, Take 1 tablet (10 mg total) by mouth once daily., Disp: 30 tablet, Rfl: 11    temazepam (RESTORIL) 15 mg Cap, Take 1 capsule (15 mg total) by mouth nightly as needed., Disp: 30 capsule, Rfl: 1    Review of Systems   Constitutional: Negative for chills, fever and unexpected weight change.   HENT: Negative for ear pain, rhinorrhea and sore throat.    Eyes: Negative for pain and visual disturbance.   Respiratory: Negative for cough and shortness of breath.    Cardiovascular: Negative for chest pain, palpitations and leg swelling.   Gastrointestinal: Negative for abdominal pain, diarrhea, nausea  "and vomiting.   Genitourinary: Negative for difficulty urinating, hematuria and vaginal bleeding.   Musculoskeletal: Negative for arthralgias.        Hip pain   Skin: Negative for rash.   Neurological: Negative for dizziness, weakness and headaches.   Psychiatric/Behavioral: Positive for sleep disturbance. Negative for agitation. The patient is nervous/anxious.           Objective:      Vitals:    10/15/20 1208   BP: 118/74   Pulse: 72   Temp: 98.7 °F (37.1 °C)   Weight: 64.9 kg (143 lb)   Height: 5' 3" (1.6 m)     Physical Exam  Constitutional:       Appearance: She is well-developed.   HENT:      Right Ear: External ear normal.      Left Ear: External ear normal.   Eyes:      Conjunctiva/sclera: Conjunctivae normal.      Pupils: Pupils are equal, round, and reactive to light.   Neck:      Musculoskeletal: Normal range of motion and neck supple.      Vascular: No JVD.   Cardiovascular:      Rate and Rhythm: Normal rate and regular rhythm.      Heart sounds: No murmur.   Pulmonary:      Effort: Pulmonary effort is normal.      Breath sounds: Normal breath sounds.   Abdominal:      General: Bowel sounds are normal.      Palpations: Abdomen is soft.   Musculoskeletal:         General: No deformity.      Right hip: She exhibits decreased range of motion. She exhibits normal strength and no tenderness.   Feet:      Right foot:      Protective Sensation: 5 sites tested. 5 sites sensed.      Left foot:      Protective Sensation: 5 sites tested. 5 sites sensed.   Lymphadenopathy:      Cervical: No cervical adenopathy.   Skin:     General: Skin is warm and dry.      Findings: No rash.   Neurological:      Mental Status: She is alert and oriented to person, place, and time.      Gait: Gait normal.   Psychiatric:         Speech: Speech normal.         Behavior: Behavior normal.           Assessment:       1. Uncontrolled type 1 diabetes mellitus with hyperglycemia    2. Vitamin D deficiency    3. Anxiety    4. Depression, " unspecified depression type    5. Insomnia, unspecified type    6. Hip pain         Plan:       Uncontrolled type 1 diabetes mellitus with hyperglycemia  -     POCT HEMOGLOBIN A1C  -     Ambulatory referral/consult to Endocrinology; Future; Expected date: 10/22/2020    Vitamin D deficiency    Anxiety  -     escitalopram oxalate (LEXAPRO) 10 MG tablet; Take 1 tablet (10 mg total) by mouth once daily.  Dispense: 30 tablet; Refill: 11    Depression, unspecified depression type  -     escitalopram oxalate (LEXAPRO) 10 MG tablet; Take 1 tablet (10 mg total) by mouth once daily.  Dispense: 30 tablet; Refill: 11    Insomnia, unspecified type  -     temazepam (RESTORIL) 15 mg Cap; Take 1 capsule (15 mg total) by mouth nightly as needed.  Dispense: 30 capsule; Refill: 1    Hip pain  -     X-Ray Hip 2 or 3 views Right; Future      Follow up in about 4 weeks (around 11/12/2020) for medication management.        10/18/2020 Aleja Bashir

## 2020-10-16 LAB — HBA1C MFR BLD: 8.5 %

## 2020-10-19 ENCOUNTER — TELEPHONE (OUTPATIENT)
Dept: FAMILY MEDICINE | Facility: CLINIC | Age: 35
End: 2020-10-19

## 2020-10-19 DIAGNOSIS — E55.9 VITAMIN D DEFICIENCY: ICD-10-CM

## 2020-10-19 DIAGNOSIS — Z79.899 HIGH RISK MEDICATION USE: ICD-10-CM

## 2020-10-19 DIAGNOSIS — E10.65 UNCONTROLLED TYPE 1 DIABETES MELLITUS WITH HYPERGLYCEMIA: Primary | ICD-10-CM

## 2020-10-19 DIAGNOSIS — F32.A DEPRESSION, UNSPECIFIED DEPRESSION TYPE: ICD-10-CM

## 2020-10-19 NOTE — TELEPHONE ENCOUNTER
Spoke to pt regarding message below. Pt stated she started Lexapro 10 mg . She isn't able to eat anything and that she keeps running to the restroom. Pt wanted to know if Aleja could [facility] in her [facility]

## 2020-10-19 NOTE — TELEPHONE ENCOUNTER
----- Message from Nereida Galvan sent at 10/19/2020  9:36 AM CDT -----   9:04   The patient saw Aleja last week. She put her on some medication and she is having some side effects. Please call   pt's #  [phone]

## 2020-10-19 NOTE — TELEPHONE ENCOUNTER
Spoke to pt regarding message below. Pt verbalized understanding. Pt stated she is only taking the 5mg of the lexapro

## 2020-10-19 NOTE — TELEPHONE ENCOUNTER
Spoke to pt regarding message below. Pt stated she didn't take the med this morning, but she has been having  Diarrhea. Pt is wanting to switch to different med

## 2020-10-20 RX ORDER — CITALOPRAM 10 MG/1
10 TABLET ORAL DAILY
Qty: 30 TABLET | Refills: 11 | Status: SHIPPED | OUTPATIENT
Start: 2020-10-20 | End: 2020-12-03 | Stop reason: SDUPTHER

## 2020-10-20 NOTE — TELEPHONE ENCOUNTER
Spoke to pt regarding message below. Pt verbalized understanding. I was able to get pt scheduled on 11/18

## 2020-11-03 NOTE — TELEPHONE ENCOUNTER
----- Message from Sarah Bustillo sent at 11/3/2020  8:31 AM CST -----  Regarding: refills and referral  Refill on BASAGLAR KWIKPEN U-100 INSULIN 100 unit/mL (3 mL) InPn pen   Hemolog kwikpen   Pharm [facility]    And referral to the endocrinologist   Pt [phone]

## 2020-11-04 RX ORDER — INSULIN LISPRO 100 [IU]/ML
100 INJECTION, SOLUTION INTRAVENOUS; SUBCUTANEOUS DAILY
Qty: 10 ML | Refills: 3 | Status: SHIPPED | OUTPATIENT
Start: 2020-11-04 | End: 2020-11-11

## 2020-11-04 RX ORDER — INSULIN GLARGINE 100 [IU]/ML
35 INJECTION, SOLUTION SUBCUTANEOUS DAILY
Qty: 3 ML | Refills: 3 | Status: SHIPPED | OUTPATIENT
Start: 2020-11-04 | End: 2021-01-05 | Stop reason: SDUPTHER

## 2020-11-11 ENCOUNTER — OFFICE VISIT (OUTPATIENT)
Dept: ENDOCRINOLOGY | Facility: CLINIC | Age: 35
End: 2020-11-11
Payer: COMMERCIAL

## 2020-11-11 ENCOUNTER — TELEPHONE (OUTPATIENT)
Dept: FAMILY MEDICINE | Facility: CLINIC | Age: 35
End: 2020-11-11

## 2020-11-11 VITALS
HEART RATE: 75 BPM | BODY MASS INDEX: 25.45 KG/M2 | WEIGHT: 143.63 LBS | TEMPERATURE: 98 F | DIASTOLIC BLOOD PRESSURE: 70 MMHG | SYSTOLIC BLOOD PRESSURE: 104 MMHG | HEIGHT: 63 IN

## 2020-11-11 DIAGNOSIS — E10.65 UNCONTROLLED TYPE 1 DIABETES MELLITUS WITH HYPERGLYCEMIA: Primary | ICD-10-CM

## 2020-11-11 DIAGNOSIS — F32.A DEPRESSION, UNSPECIFIED DEPRESSION TYPE: ICD-10-CM

## 2020-11-11 DIAGNOSIS — Z82.49 FAMILY HISTORY OF HEART DISEASE: ICD-10-CM

## 2020-11-11 LAB
1,25(OH)2D SERPL-MCNC: 63 PG/ML
1,25(OH)2D2 SERPL-MCNC: <10 PG/ML
1,25(OH)2D3 SERPL-MCNC: 62 PG/ML
ALBUMIN SERPL-MCNC: 3.8 G/DL (ref 3.8–4.8)
ALBUMIN/CREAT UR: <7 MG/G CREAT (ref 0–29)
ALBUMIN/GLOB SERPL: 1.3 {RATIO} (ref 1.2–2.2)
ALP SERPL-CCNC: 60 IU/L (ref 39–117)
ALT SERPL-CCNC: 14 IU/L (ref 0–32)
APPEARANCE UR: CLEAR
AST SERPL-CCNC: 14 IU/L (ref 0–40)
BACTERIA #/AREA URNS HPF: NORMAL /[HPF]
BASOPHILS # BLD AUTO: 0 X10E3/UL (ref 0–0.2)
BASOPHILS NFR BLD AUTO: 1 %
BILIRUB SERPL-MCNC: 0.3 MG/DL (ref 0–1.2)
BILIRUB UR QL STRIP: NEGATIVE
BUN SERPL-MCNC: 8 MG/DL (ref 6–20)
BUN/CREAT SERPL: 12 (ref 9–23)
CALCIUM SERPL-MCNC: 9.3 MG/DL (ref 8.7–10.2)
CHLORIDE SERPL-SCNC: 101 MMOL/L (ref 96–106)
CHOLEST SERPL-MCNC: 187 MG/DL (ref 100–199)
CO2 SERPL-SCNC: 25 MMOL/L (ref 20–29)
COLOR UR: YELLOW
CREAT SERPL-MCNC: 0.67 MG/DL (ref 0.57–1)
CREAT UR-MCNC: 44.6 MG/DL
EOSINOPHIL # BLD AUTO: 0.2 X10E3/UL (ref 0–0.4)
EOSINOPHIL NFR BLD AUTO: 3 %
EPI CELLS #/AREA URNS HPF: NORMAL /HPF (ref 0–10)
ERYTHROCYTE [DISTWIDTH] IN BLOOD BY AUTOMATED COUNT: 12.9 % (ref 11.7–15.4)
GLOBULIN SER CALC-MCNC: 3 G/DL (ref 1.5–4.5)
GLUCOSE SERPL-MCNC: 226 MG/DL (ref 65–99)
GLUCOSE UR QL: ABNORMAL
HCT VFR BLD AUTO: 45.5 % (ref 34–46.6)
HDLC SERPL-MCNC: 89 MG/DL
HGB BLD-MCNC: 13.9 G/DL (ref 11.1–15.9)
HGB UR QL STRIP: NEGATIVE
IMM GRANULOCYTES # BLD AUTO: 0 X10E3/UL (ref 0–0.1)
IMM GRANULOCYTES NFR BLD AUTO: 0 %
KETONES UR QL STRIP: NEGATIVE
LDLC SERPL CALC-MCNC: 81 MG/DL (ref 0–99)
LEUKOCYTE ESTERASE UR QL STRIP: NEGATIVE
LYMPHOCYTES # BLD AUTO: 2.1 X10E3/UL (ref 0.7–3.1)
LYMPHOCYTES NFR BLD AUTO: 35 %
MCH RBC QN AUTO: 27.4 PG (ref 26.6–33)
MCHC RBC AUTO-ENTMCNC: 30.5 G/DL (ref 31.5–35.7)
MCV RBC AUTO: 90 FL (ref 79–97)
MICRO URNS: ABNORMAL
MICRO URNS: ABNORMAL
MICROALBUMIN UR-MCNC: <3 UG/ML
MONOCYTES # BLD AUTO: 0.5 X10E3/UL (ref 0.1–0.9)
MONOCYTES NFR BLD AUTO: 7 %
MUCOUS THREADS URNS QL MICRO: PRESENT
NEUTROPHILS # BLD AUTO: 3.3 X10E3/UL (ref 1.4–7)
NEUTROPHILS NFR BLD AUTO: 54 %
NITRITE UR QL STRIP: NEGATIVE
PH UR STRIP: 6.5 [PH] (ref 5–7.5)
PLATELET # BLD AUTO: 300 X10E3/UL (ref 150–450)
POTASSIUM SERPL-SCNC: 4.7 MMOL/L (ref 3.5–5.2)
PROT SERPL-MCNC: 6.8 G/DL (ref 6–8.5)
PROT UR QL STRIP: NEGATIVE
RBC # BLD AUTO: 5.07 X10E6/UL (ref 3.77–5.28)
RBC #/AREA URNS HPF: NORMAL /HPF (ref 0–2)
SODIUM SERPL-SCNC: 136 MMOL/L (ref 134–144)
SP GR UR: 1.01 (ref 1–1.03)
TRIGL SERPL-MCNC: 96 MG/DL (ref 0–149)
TSH SERPL DL<=0.005 MIU/L-ACNC: 1.98 UIU/ML (ref 0.45–4.5)
URINALYSIS REFLEX: ABNORMAL
UROBILINOGEN UR STRIP-MCNC: 0.2 MG/DL (ref 0.2–1)
VLDLC SERPL CALC-MCNC: 17 MG/DL (ref 5–40)
WBC # BLD AUTO: 6.1 X10E3/UL (ref 3.4–10.8)
WBC #/AREA URNS HPF: NORMAL /HPF (ref 0–5)

## 2020-11-11 PROCEDURE — 99204 PR OFFICE/OUTPT VISIT, NEW, LEVL IV, 45-59 MIN: ICD-10-PCS | Mod: S$GLB,,, | Performed by: NURSE PRACTITIONER

## 2020-11-11 PROCEDURE — 99204 OFFICE O/P NEW MOD 45 MIN: CPT | Mod: S$GLB,,, | Performed by: NURSE PRACTITIONER

## 2020-11-11 PROCEDURE — 3052F HG A1C>EQUAL 8.0%<EQUAL 9.0%: CPT | Mod: CPTII,S$GLB,, | Performed by: NURSE PRACTITIONER

## 2020-11-11 PROCEDURE — 99999 PR PBB SHADOW E&M-EST. PATIENT-LVL IV: CPT | Mod: PBBFAC,,, | Performed by: NURSE PRACTITIONER

## 2020-11-11 PROCEDURE — 3052F PR MOST RECENT HEMOGLOBIN A1C LEVEL 8.0 - < 9.0%: ICD-10-PCS | Mod: CPTII,S$GLB,, | Performed by: NURSE PRACTITIONER

## 2020-11-11 PROCEDURE — 99999 PR PBB SHADOW E&M-EST. PATIENT-LVL IV: ICD-10-PCS | Mod: PBBFAC,,, | Performed by: NURSE PRACTITIONER

## 2020-11-11 RX ORDER — BLOOD-GLUCOSE,RECEIVER,CONT
EACH MISCELLANEOUS
Qty: 1 EACH | Refills: 0 | Status: SHIPPED | OUTPATIENT
Start: 2020-11-11 | End: 2021-03-18 | Stop reason: SDUPTHER

## 2020-11-11 RX ORDER — BLOOD-GLUCOSE SENSOR
EACH MISCELLANEOUS
Qty: 3 DEVICE | Refills: 12 | Status: SHIPPED | OUTPATIENT
Start: 2020-11-11 | End: 2021-03-18 | Stop reason: SDUPTHER

## 2020-11-11 RX ORDER — BLOOD-GLUCOSE TRANSMITTER
EACH MISCELLANEOUS
Qty: 1 DEVICE | Refills: 3 | Status: SHIPPED | OUTPATIENT
Start: 2020-11-11 | End: 2021-03-18 | Stop reason: SDUPTHER

## 2020-11-11 RX ORDER — INSULIN LISPRO 100 [IU]/ML
INJECTION, SOLUTION INTRAVENOUS; SUBCUTANEOUS
Qty: 15 ML | Refills: 12 | Status: SHIPPED | OUTPATIENT
Start: 2020-11-11 | End: 2021-10-27

## 2020-11-11 RX ORDER — PEN NEEDLE, DIABETIC 30 GX3/16"
NEEDLE, DISPOSABLE MISCELLANEOUS
Qty: 150 EACH | Refills: 12 | Status: SHIPPED | OUTPATIENT
Start: 2020-11-11 | End: 2022-03-31

## 2020-11-11 NOTE — PROGRESS NOTES
CC: This 35 y.o. female presents for management of diabetes, depression    HPI: She was diagnosed with T1DM at age 8 years. Hospitalized r/t DM at diagnosis  Family hx of DM: no one   Has two children 17 y/o daughter, 11 y/o son  - tubal ligation  and ablation     Former patient of Dr Ramos  No meter, no logs  Reports checking bg as below  hypoglycemia at home- having hypos in the middle of night 40-60s- a few times a week   monitoring BG at home:  Fastin-170  Otherwise: 140-150s  Not always bolusing before meal, may be over an hour after meals    Diet: Eats 2  Meals a day, snacks- rarely   Skips breakfast, coffee only  Exercise: gym twice a week for ~ 1 hour- cardio and weights  CURRENT DM MEDS: Basaglar 35 u qam, Humalog carb ratio 1:15, ISF 40, Target 120   Timing prandial insulin 5-15 minutes before meals: no  Vial/pen:  Uses pen  Glucometer type:  One Touch Ultra Mini 2 yrs     Standards of Care:  Eye exam: 2020 , follows w Dr Nobles     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   Mom also with MI at age 37 years    ROS:   Gen: Appetite good, no weight gain or loss, denies fatigue and weakness.  Skin: Skin is intact and heals well, no rashes, no hair changes  Eyes: Denies visual disturbances  Resp: no SOB or POWELL, no cough  Cardiac: No palpitations, chest pain, no edema   GI: No nausea or vomiting, diarrhea, constipation, or abdominal pain.  /GYN: No nocturia, burning or pain.   MS/Neuro: Denies numbness/ tingling in BLE; Gait steady, speech clear  Psych: Denies drug/ETOH abuse, no hx of depression.  Other systems: negative.    PE:  GENERAL: Well developed, well nourished.  PSYCH: AAOx3, appropriate mood and affect, pleasant expression, conversant, appears relaxed, well groomed.   EYES: Conjunctiva, corneas clear  NECK: Supple, trachea midline,no thyromegaly or nodules  CHEST: Resp even and unlabored, CTA bilateral.  CARDIAC: RRR, S1, S2 heard, no murmurs  ABDOMEN: Soft,  non-tender, non-distended   VASCULAR: DP pulses +2/4 bilaterally, no edema.  NEURO: Gait steady  SKIN: Skin warm and dry no acanthosis nigracans.  FOOT EXAMINATION: 11/11/2020  No foot deformity, corns or callus formation,  nails in good condition and well trimmed, no interspace maceration or ulceration noted    Protective sensation intact with 10 gram monofilament.  +2 dorsalis pedis and posterior pulses noted.    No results found for: MICALBCREAT    Hemoglobin A1C   Date Value Ref Range Status   10/16/2020 8.5 % Final   08/16/2019 8.7 % Final        ASSESSMENT and PLAN:    1. T1DM with hyperglycemia-     Dm edu Friday- carb counting review  Facesheet to [employer]- would like X2 in IQ control   RX for Continuous Glucose Monitor   -Recommend Dexcom  Continuous Glucose monitor                         Pt tests glucoses a minimum of 4 x a day.                          Pt will use continuous insulin pump.                         Pt would benefit from therapeutic continuous glucose monitor to be able                         to make frequent insulin adjustments, based on current glucoses.   Discussed A1c and BG goals.  Check bg 4 times a day    2. Depression- stable , chronic managed by PCP    3. [facility] cardiac dz- mom w MI at 37 yrs, requests cards eval     Follow-up: 2 w after pump start and in 3 months with lab prior

## 2020-11-11 NOTE — LETTER
November 11, 2020      Aleja Bashir, NATE  [address]  [address]  [place] [address]           [place] [facility] Endo/Diabetes  [address]  [place] [address]  Phone: [phone]          Patient: Marah Robert   MR Number: [id number]   YOB: 1985   Date of Visit: 11/11/2020       Dear Aleja Bashir:    Thank you for referring Marah Robert to me for evaluation. Attached you will find relevant portions of my assessment and plan of care.    If you have questions, please do not hesitate to call me. I look forward to following Marah Robert along with you.    Sincerely,    Elaine Hernadez, RITCHIE, NP    Enclosure  CC:  No Recipients    If you would like to receive this communication electronically, please contact [email or web address] or [phone] to request more information on [employer] Link access.    For providers and/or their staff who would like to refer a patient to [facility], please contact us through our one-stop-shop provider referral line, [facility], at [phone].    If you feel you have received this communication in error or would no longer like to receive these types of communications, please e-mail [email or web address]

## 2020-11-13 ENCOUNTER — PATIENT MESSAGE (OUTPATIENT)
Dept: DIABETES | Facility: CLINIC | Age: 35
End: 2020-11-13

## 2020-11-13 ENCOUNTER — CLINICAL SUPPORT (OUTPATIENT)
Dept: DIABETES | Facility: CLINIC | Age: 35
End: 2020-11-13
Payer: COMMERCIAL

## 2020-11-13 VITALS — BODY MASS INDEX: 25.47 KG/M2 | WEIGHT: 143.75 LBS | HEIGHT: 63 IN

## 2020-11-13 DIAGNOSIS — E10.65 UNCONTROLLED TYPE 1 DIABETES MELLITUS WITH HYPERGLYCEMIA: ICD-10-CM

## 2020-11-13 PROCEDURE — G0108 DIAB MANAGE TRN  PER INDIV: HCPCS | Mod: S$GLB,,, | Performed by: DIETITIAN, REGISTERED

## 2020-11-13 PROCEDURE — G0108 PR DIAB MANAGE TRN  PER INDIV: ICD-10-PCS | Mod: S$GLB,,, | Performed by: DIETITIAN, REGISTERED

## 2020-11-13 PROCEDURE — 99999 PR PBB SHADOW E&M-EST. PATIENT-LVL III: CPT | Mod: PBBFAC,,, | Performed by: DIETITIAN, REGISTERED

## 2020-11-13 PROCEDURE — 99999 PR PBB SHADOW E&M-EST. PATIENT-LVL III: ICD-10-PCS | Mod: PBBFAC,,, | Performed by: DIETITIAN, REGISTERED

## 2020-11-16 ENCOUNTER — PATIENT MESSAGE (OUTPATIENT)
Dept: DIABETES | Facility: CLINIC | Age: 35
End: 2020-11-16

## 2020-11-18 NOTE — PROGRESS NOTES
Diabetes Education  Author: Susan Rider RD, CDE  Date: 11/18/2020    Diabetes Care Management Summary  Diabetes Education Record Assessment/Progress: Initial  Current Diabetes Risk Level: Moderate     Last A1c:   Lab Results   Component Value Date    HGBA1C 8.5 10/16/2020    HGBA1C 8.7 08/16/2019     Last visit with Diabetes Educator: : 11/13/2020      Diabetes Type  Diabetes Type : Type I    Diabetes History  Diabetes Diagnosis: >10 years  Current Treatment: Insulin(35 Units of Basaglar at 10am with Humalog when runs high)  Reviewed Problem List with Patient: Yes    Health Maintenance was reviewed today with patient. Discussed with patient importance of routine eye exams, foot exams/foot care, blood work (i.e.: A1c, microalbumin, and lipid), dental visits, yearly flu vaccine, and pneumonia vaccine as indicated by PCP. Patient verbalized understanding.     Health Maintenance Topics with due status: Not Due       Topic Last Completion Date    Cervical Cancer Screening 05/01/2019    Hemoglobin A1c 10/16/2020    Lipid Panel 11/03/2020    Urine Microalbumin 11/03/2020    Foot Exam 11/11/2020     Health Maintenance Due   Topic Date Due    Hepatitis C Screening  1985    HIV Screening  06/09/1998    TETANUS VACCINE  10/23/2015    Eye Exam  05/07/2020    Influenza Vaccine (1) 08/01/2020       Nutrition  Meal Planning: skipping meals, water, diet drinks  What type of sweetener do you use?: Splenda  What type of beverages do you drink?: diet soda/tea, water  Meal Plan 24 Hour Recall - Breakfast: (Usually just coffee sweetened with Splenda)  Meal Plan 24 Hour Recall - Lunch: (Planning to have Chic Jaylen A salad with diet drink today)  Meal Plan 24 Hour Recall - Dinner: (Cooks hot meal)  Meal Plan 24 Hour Recall - Snack: (Fruit and nuts)    Monitoring   Monitoring: One Touch Verio IQ  Self Monitoring : (This am blood sugar 120)  Blood Glucose Logs: Yes  Do you use a personal continuous glucose monitor?:  No(Placed sample on her today)  In the last month, how often have you had a low blood sugar reaction?: more than once a week  What are your symptoms of low blood sugar?: (Shaky and weak)  How do you treat low blood sugar?: (Drinks juice)  Can you tell when your blood sugar is too high?: sometimes  How do you treat high blood sugar?: (Takes a couple units Humalog)        Exercise   Exercise Type: none    Current Diabetes Treatment   Current Treatment: Insulin(35 Units of Basaglar at 10am with Humalog when runs high)    Social History  Preferred Learning Method: Face to Face, Demonstration, Reading Materials, Hands On  Primary Support: Self, Spouse  Educational Level: College Graduate  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)  Smoking Status: Never a Smoker  Alcohol Use: Rarely      Barriers to Change  Barriers to Change: None  Learning Challenges : None    Readiness to Learn   Readiness to Learn : Eager    Cultural Influences  Cultural Influences: None    Diabetes Education Assessment/Progress  Diabetes Disease Process (diabetes disease process and treatment options): Discussion, Instructed, Comprehends Key Points(Educated patient and reviewed A1C and goal of target range)  Nutrition (Incorporating nutritional management into one's lifestyle): Discussion, Instructed, Comprehends Key Points, Demonstrates Understanding/Competency (verbalizes/demonstrates)(Educated patient on importance of eating 3 times per day and how macrnutrients effect blood sugars)  Physical Activity (incorporating physical activity into one's lifestyle): Discussion  Medications (states correct name, dose, onset, peak, duration, side effects & timing of meds): Discussion, Instructed, Comprehends Key Points, Demonstrates Understanding/Competency(verbalizes/demonstrates), Written Materials Provided(Educated patient on how basal and prandial insulin work and to prevent stacking of insulins)  Monitoring (monitoring blood glucose/other parameters &  using results): Discussion, Instructed, Comprehends Key Points, Demonstrates Understanding/Competency (verbalizes/demonstrates), Return Demonstration, Written Materials Provided(Provided sample 10 day dexcom)  Acute Complications (preventing, detecting, and treating acute complications): Discussion  Chronic Complications (preventing, detecting, and treating chronic complications): Discussion  Clinical (diabetes, other pertinent medical history, and relevant comorbidities reviewed during visit): Discussion  Cognitive (knowledge of self-management skills, functional health literacy): Discussion  Psychosocial (emotional response to diabetes): Discussion  Diabetes Distress and Support Systems: Discussion  Behavioral (readiness for change, lifestyle practices, self-care behaviors): Discussion    Goals  Patient has selected/evaluated goals during today's session: Yes, selected  Healthy Eating: Set  Start Date: 11/13/20  Target Date: 02/13/21  Physical Activity: In Progress  Monitoring: Set  Start Date: 11/13/20  Target Date: 02/13/21  Medications: Set  Start Date: 11/13/20(25 Units Basaglar at 10am with Carb Ratio of 10 and ISF 40 Target 120)  Problem Solving: In Progress  Healthy Coping: In Progress  Reducing Risks: In Progress    Diabetes Self-Management Support Plan  Review Status: Patient has selected and agrees to support plan.    Diabetes Care Plan/Intervention  Education Plan/Intervention: Individual Follow-Up DSMT, Insulin Pump Evaluation, Sensor Training(Will follow up with sensor trial and start paperwork for pump)    Diabetes Meal Plan  Restrictions: Restricted Carbohydrate  Calories: 1500  Carbohydrate Per Meal: 30-45g  Carbohydrate Per Snack : 7-15g    Today's Self-Management Care Plan was developed with the patient's input and is based on barriers identified during today's assessment.    The long and short-term goals in the care plan were written with the patient/caregiver's input. The patient has agreed to  work toward these goals to improve her overall diabetes control.      The patient received a copy of today's self-management plan and verbalized understanding of the care plan, goals, and all of today's instructions.      The patient was encouraged to communicate with her physician and care team regarding her condition(s) and treatment.  I provided the patient with my contact information today and encouraged her to contact me via phone or patient portal as needed.     Education Units of Time   Time Spent: 60 min

## 2020-11-23 ENCOUNTER — TELEPHONE (OUTPATIENT)
Dept: FAMILY MEDICINE | Facility: CLINIC | Age: 35
End: 2020-11-23

## 2020-11-24 ENCOUNTER — PATIENT MESSAGE (OUTPATIENT)
Dept: FAMILY MEDICINE | Facility: CLINIC | Age: 35
End: 2020-11-24

## 2020-11-27 ENCOUNTER — TELEPHONE (OUTPATIENT)
Dept: ENDOCRINOLOGY | Facility: CLINIC | Age: 35
End: 2020-11-27

## 2020-11-27 NOTE — TELEPHONE ENCOUNTER
Spoke with Ann Marie at [place] and informed her it was faxed this morning.      ----- Message from [facility] sent at 11/27/2020 12:45 PM CST -----  [facility] called to check the status of the glucose monitor continuation request please reach out to them at [phone]

## 2020-12-01 ENCOUNTER — TELEPHONE (OUTPATIENT)
Dept: ENDOCRINOLOGY | Facility: CLINIC | Age: 35
End: 2020-12-01

## 2020-12-01 NOTE — TELEPHONE ENCOUNTER
----- Message from Geronimo Spencer sent at 12/1/2020  2:41 PM CST -----  Type: Needs Medical Advice  Who Called:  [facility] Call Back Number: [phone]  Additional Information: Caller states that she would like a callback regarding the status of a fax that was sent on 11/25.

## 2020-12-02 ENCOUNTER — TELEPHONE (OUTPATIENT)
Dept: ENDOCRINOLOGY | Facility: CLINIC | Age: 35
End: 2020-12-02

## 2020-12-02 NOTE — TELEPHONE ENCOUNTER
----- Message from Claudia Lilly sent at 12/2/2020  9:34 AM CST -----  Type: Needs Medical Advice  Who Called:  Billie with [employer] supplies  Symptoms (please be specific):    How long has patient had these symptoms:  Pharmacy name and phone #:    [facility] Call Back Number: [phone]  Additional Information: requesting office visit and medication list how often pt test blood sugar and inject daily fax to

## 2020-12-03 ENCOUNTER — OFFICE VISIT (OUTPATIENT)
Dept: FAMILY MEDICINE | Facility: CLINIC | Age: 35
End: 2020-12-03
Payer: COMMERCIAL

## 2020-12-03 VITALS
BODY MASS INDEX: 24.98 KG/M2 | DIASTOLIC BLOOD PRESSURE: 68 MMHG | SYSTOLIC BLOOD PRESSURE: 124 MMHG | HEART RATE: 60 BPM | HEIGHT: 63 IN | WEIGHT: 141 LBS

## 2020-12-03 DIAGNOSIS — E10.65 UNCONTROLLED TYPE 1 DIABETES MELLITUS WITH HYPERGLYCEMIA: Primary | ICD-10-CM

## 2020-12-03 DIAGNOSIS — F32.A DEPRESSION, UNSPECIFIED DEPRESSION TYPE: ICD-10-CM

## 2020-12-03 DIAGNOSIS — Z79.899 HIGH RISK MEDICATION USE: ICD-10-CM

## 2020-12-03 DIAGNOSIS — G47.00 INSOMNIA, UNSPECIFIED TYPE: ICD-10-CM

## 2020-12-03 PROCEDURE — 3008F PR BODY MASS INDEX (BMI) DOCUMENTED: ICD-10-PCS | Mod: S$GLB,,, | Performed by: NURSE PRACTITIONER

## 2020-12-03 PROCEDURE — 99214 PR OFFICE/OUTPT VISIT, EST, LEVL IV, 30-39 MIN: ICD-10-PCS | Mod: S$GLB,,, | Performed by: NURSE PRACTITIONER

## 2020-12-03 PROCEDURE — 3008F BODY MASS INDEX DOCD: CPT | Mod: S$GLB,,, | Performed by: NURSE PRACTITIONER

## 2020-12-03 PROCEDURE — 3052F PR MOST RECENT HEMOGLOBIN A1C LEVEL 8.0 - < 9.0%: ICD-10-PCS | Mod: S$GLB,,, | Performed by: NURSE PRACTITIONER

## 2020-12-03 PROCEDURE — 99214 OFFICE O/P EST MOD 30 MIN: CPT | Mod: S$GLB,,, | Performed by: NURSE PRACTITIONER

## 2020-12-03 PROCEDURE — 3052F HG A1C>EQUAL 8.0%<EQUAL 9.0%: CPT | Mod: S$GLB,,, | Performed by: NURSE PRACTITIONER

## 2020-12-03 RX ORDER — PEN NEEDLE, DIABETIC 31 GX5/16"
NEEDLE, DISPOSABLE MISCELLANEOUS
COMMUNITY
Start: 2020-11-11 | End: 2022-03-31 | Stop reason: SDUPTHER

## 2020-12-03 RX ORDER — TEMAZEPAM 15 MG/1
CAPSULE ORAL
Qty: 90 CAPSULE | Refills: 1 | Status: SHIPPED | OUTPATIENT
Start: 2020-12-03 | End: 2021-02-18 | Stop reason: SDUPTHER

## 2020-12-03 RX ORDER — CITALOPRAM 10 MG/1
10 TABLET ORAL DAILY
Qty: 30 TABLET | Refills: 11 | Status: SHIPPED | OUTPATIENT
Start: 2020-12-03 | End: 2021-03-18

## 2020-12-03 RX ORDER — TEMAZEPAM 15 MG/1
CAPSULE ORAL
COMMUNITY
Start: 2020-11-14 | End: 2020-12-03 | Stop reason: SDUPTHER

## 2020-12-03 NOTE — PROGRESS NOTES
SUBJECTIVE:    Patient ID: Marah Robert is a 35 y.o. female.    Chief Complaint: Medication Refill (brought bottles// SW)    35 year old female presents for follow up. She was diagnosed with type 1 diabetes around the age of 23. Does not watch diet. Sugars are up and down. Currently seeing endocrine. Plans to get insulin pump and dexcom monitor.   Seeing therapist. Thinks it is helping. Sleeping better. Happy with meds.   Reviewed medications list.        Telephone on 10/19/2020   Component Date Value Ref Range Status    WBC 11/03/2020 6.1  3.4 - 10.8 x10E3/uL Final    RBC 11/03/2020 5.07  3.77 - 5.28 x10E6/uL Final    Hemoglobin 11/03/2020 13.9  11.1 - 15.9 g/dL Final    Hematocrit 11/03/2020 45.5  34.0 - 46.6 % Final    MCV 11/03/2020 90  79 - 97 fL Final    MCH 11/03/2020 27.4  26.6 - 33.0 pg Final    MCHC 11/03/2020 30.5* 31.5 - 35.7 g/dL Final    RDW 11/03/2020 12.9  11.7 - 15.4 % Final    Platelets 11/03/2020 300  150 - 450 x10E3/uL Final    Neutrophils 11/03/2020 54  Not Estab. % Final    Lymphs 11/03/2020 35  Not Estab. % Final    Monocytes 11/03/2020 7  Not Estab. % Final    Eos 11/03/2020 3  Not Estab. % Final    Basos 11/03/2020 1  Not Estab. % Final    Neutrophils (Absolute) 11/03/2020 3.3  1.4 - 7.0 x10E3/uL Final    Lymphs (Absolute) 11/03/2020 2.1  0.7 - 3.1 x10E3/uL Final    Monocytes(Absolute) 11/03/2020 0.5  0.1 - 0.9 x10E3/uL Final    Eos (Absolute) 11/03/2020 0.2  0.0 - 0.4 x10E3/uL Final    Baso (Absolute) 11/03/2020 0.0  0.0 - 0.2 x10E3/uL Final    Immature Granulocytes 11/03/2020 0  Not Estab. % Final    Immature Grans (Abs) 11/03/2020 0.0  0.0 - 0.1 x10E3/uL Final    Glucose 11/03/2020 226* 65 - 99 mg/dL Final    BUN 11/03/2020 8  6 - 20 mg/dL Final    Creatinine 11/03/2020 0.67  0.57 - 1.00 mg/dL Final    eGFR if non African American 11/03/2020 113  >59 mL/min/1.73 Final    eGFR if African American 11/03/2020 130  >59 mL/min/1.73 Final    BUN/Creatinine  Ratio 11/03/2020 12  9 - 23 Final    Sodium 11/03/2020 136  134 - 144 mmol/L Final    Potassium 11/03/2020 4.7  3.5 - 5.2 mmol/L Final    Chloride 11/03/2020 101  96 - 106 mmol/L Final    CO2 11/03/2020 25  20 - 29 mmol/L Final    Calcium 11/03/2020 9.3  8.7 - 10.2 mg/dL Final    Protein, Total 11/03/2020 6.8  6.0 - 8.5 g/dL Final    Albumin 11/03/2020 3.8  3.8 - 4.8 g/dL Final    Globulin, Total 11/03/2020 3.0  1.5 - 4.5 g/dL Final    Albumin/Globulin Ratio 11/03/2020 1.3  1.2 - 2.2 Final    Total Bilirubin 11/03/2020 0.3  0.0 - 1.2 mg/dL Final    Alkaline Phosphatase 11/03/2020 60  39 - 117 IU/L Final    AST 11/03/2020 14  0 - 40 IU/L Final    ALT 11/03/2020 14  0 - 32 IU/L Final    Cholesterol 11/03/2020 187  100 - 199 mg/dL Final    Triglycerides 11/03/2020 96  0 - 149 mg/dL Final    HDL 11/03/2020 89  >39 mg/dL Final    VLDL Cholesterol [place] 11/03/2020 17  5 - 40 mg/dL Final    LDL Calculated 11/03/2020 81  0 - 99 mg/dL Final    TSH 11/03/2020 1.980  0.450 - 4.500 uIU/mL Final    Creatinine, Urine 11/03/2020 44.6  Not Estab. mg/dL Final    Microalb, Ur 11/03/2020 <3.0  Not Estab. ug/mL Final    Microalb/Crt. Ratio 11/03/2020 <7  0 - 29 mg/g creat Final    Specific [place], UA 11/03/2020 1.012  1.005 - 1.030 Final    pH, UA 11/03/2020 6.5  5.0 - 7.5 Final    Color, UA 11/03/2020 Yellow  Yellow Final    Clarity, UA 11/03/2020 Clear  Clear Final    Leukocytes, UA 11/03/2020 Negative  Negative Final    Protein, UA 11/03/2020 Negative  Negative/Trace Final    Glucose, UA 11/03/2020 3+* Negative Final    Ketones, UA 11/03/2020 Negative  Negative Final    Occult Blood UA 11/03/2020 Negative  Negative Final    Bilirubin, UA 11/03/2020 Negative  Negative Final    Urobilinogen, UA 11/03/2020 0.2  0.2 - 1.0 mg/dL Final    Nitrite, UA 11/03/2020 Negative  Negative Final    Microscopic Examination 11/03/2020 Comment   Final    Microscopic Examination 11/03/2020 See below:   Final     Urinalysis Reflex 11/03/2020 Comment   Final    VITAMIN D,1,25-DIHYDROXY 11/03/2020 63  pg/mL Final    VITAMIN D-2 1,25-DIHYDROXY 11/03/2020 <10  pg/mL Final    Vitamin D3, 1,25 (OH)2 11/03/2020 62  pg/mL Final    WBC, UA 11/03/2020 0-5  0 - 5 /hpf Final    RBC, UA 11/03/2020 None seen  0 - 2 /hpf Final    Epithelial Cells (non renal) 11/03/2020 0-10  0 - 10 /hpf Final    Mucus, UA 11/03/2020 Present  Not Estab. Final    Bacteria, UA 11/03/2020 Few  None seen/Few Final   Office Visit on 10/15/2020   Component Date Value Ref Range Status    Hemoglobin A1C 10/16/2020 8.5  % Final   Lab Visit on 07/16/2020   Component Date Value Ref Range Status    SARS-CoV-2 RNA, Amplification, Qual 07/16/2020 Negative   Final   Lab Visit on 07/08/2020   Component Date Value Ref Range Status    SARS-CoV2 (COVID-19) Qualitative P* 07/08/2020 Not Detected   Final       Past Medical History:   Diagnosis Date    Diabetes mellitus type I     Eustachian tube dysfunction     Vitamin D deficiency      Social History     Socioeconomic History    Marital status:      Spouse name: Not on file    Number of children: Not on file    Years of education: Not on file    Highest education level: Not on file   Occupational History    Occupation: data entery   Social Needs    Financial resource strain: Not very hard    Food insecurity     Worry: Never true     Inability: Never true    Transportation needs     Medical: No     Non-medical: No   Tobacco Use    Smoking status: Former Smoker    Smokeless tobacco: Never Used   Substance and Sexual Activity    Alcohol use: No     Frequency: Monthly or less     Drinks per session: 1 or 2     Binge frequency: Never    Drug use: No    Sexual activity: Yes     Partners: Male     Birth control/protection: None, See Surgical Hx   Lifestyle    Physical activity     Days per week: 1 day     Minutes per session: Not on file    Stress: To some extent   Relationships    Social  "connections     Talks on phone: More than three times a week     Gets together: Once a week     Attends Judaism service: Not on file     Active member of club or organization: Yes     Attends meetings of clubs or organizations: Never     Relationship status:    Other Topics Concern    Not on file   Social History Narrative    Not on file     Past Surgical History:   Procedure Laterality Date    ABLATION      LASER ABLATION      TUBAL LIGATION       Family History   Problem Relation Age of Onset    COPD Mother     Heart disease Mother     Hypertension Mother     Headaches Mother     Depression Mother     No Known Problems Father        Review of patient's allergies indicates:   Allergen Reactions    Lisinopril        Current Outpatient Medications:     BASAGLAR KWIKPEN U-100 INSULIN glargine 100 units/mL (3mL) SubQ pen, Inject 35 Units into the skin once daily., Disp: 3 mL, Rfl: 3    BD ULTRA-FINE MINI PEN NEEDLE 31 gauge x 3/16" Ndle, USE TO INJECT 4 TIMES A DAY, Disp: , Rfl:     blood-glucose meter,continuous (DEXCOM G6 ) Misc, Dispense 1 , Disp: 1 each, Rfl: 0    blood-glucose sensor (DEXCOM G6 SENSOR) Lisa, Dispense 3 sensors/month, Disp: 3 Device, Rfl: 12    blood-glucose transmitter (DEXCOM G6 TRANSMITTER) Lisa, Dispense 1 transmitter, Disp: 1 Device, Rfl: 3    citalopram (CELEXA) 10 MG tablet, Take 1 tablet (10 mg total) by mouth once daily., Disp: 30 tablet, Rfl: 11    ENSKYCE 0.15-0.03 mg per tablet, , Disp: , Rfl:     insulin lispro (HUMALOG KWIKPEN INSULIN) 100 unit/mL pen, Carb ratio 1:15, ISF 40, Max TDD 30u, Disp: 15 mL, Rfl: 12    ONETOUCH ULTRA BLUE TEST STRIP Strp, TEST 4 TIMES DAILY, Disp: , Rfl:     pen needle, diabetic 32 gauge x 5/32" Ndle, Uses 4 daily, Disp: 150 each, Rfl: 12    temazepam (RESTORIL) 15 mg Cap, TAKE 1 CAPSULE BY MOUTH NIGHTLY AS NEEDED., Disp: 90 capsule, Rfl: 1    Review of Systems   Constitutional: Negative for chills, fever and " "unexpected weight change.   HENT: Negative for ear pain, rhinorrhea and sore throat.    Eyes: Negative for pain and visual disturbance.   Respiratory: Negative for cough and shortness of breath.    Cardiovascular: Negative for chest pain, palpitations and leg swelling.   Gastrointestinal: Negative for abdominal pain, diarrhea, nausea and vomiting.   Genitourinary: Negative for difficulty urinating, hematuria and vaginal bleeding.   Musculoskeletal: Negative for arthralgias.   Skin: Negative for rash.   Neurological: Negative for dizziness, weakness and headaches.   Psychiatric/Behavioral: Negative for agitation and sleep disturbance. The patient is not nervous/anxious.           Objective:      Vitals:    12/03/20 1327   BP: 124/68   Pulse: 60   Weight: 64 kg (141 lb)   Height: 5' 3" (1.6 m)     Physical Exam  Constitutional:       Appearance: She is well-developed.   HENT:      Right Ear: External ear normal.      Left Ear: External ear normal.   Eyes:      Conjunctiva/sclera: Conjunctivae normal.      Pupils: Pupils are equal, round, and reactive to light.   Neck:      Musculoskeletal: Normal range of motion and neck supple.      Vascular: No JVD.   Cardiovascular:      Rate and Rhythm: Normal rate and regular rhythm.      Heart sounds: No murmur.   Pulmonary:      Effort: Pulmonary effort is normal.      Breath sounds: Normal breath sounds.   Abdominal:      General: Bowel sounds are normal.      Palpations: Abdomen is soft.   Musculoskeletal:         General: No deformity.      Right hip: She exhibits decreased range of motion. She exhibits normal strength and no tenderness.   Feet:      Right foot:      Protective Sensation: 5 sites tested. 5 sites sensed.      Left foot:      Protective Sensation: 5 sites tested. 5 sites sensed.   Lymphadenopathy:      Cervical: No cervical adenopathy.   Skin:     General: Skin is warm and dry.      Findings: No rash.   Neurological:      Mental Status: She is alert and " oriented to person, place, and time.      Gait: Gait normal.   Psychiatric:         Speech: Speech normal.         Behavior: Behavior normal.           Assessment:       1. Uncontrolled type 1 diabetes mellitus with hyperglycemia    2. Depression, unspecified depression type    3. High risk medication use    4. Insomnia, unspecified type         Plan:       Uncontrolled type 1 diabetes mellitus with hyperglycemia    Depression, unspecified depression type    High risk medication use    Insomnia, unspecified type    Other orders  -     citalopram (CELEXA) 10 MG tablet; Take 1 tablet (10 mg total) by mouth once daily.  Dispense: 30 tablet; Refill: 11  -     temazepam (RESTORIL) 15 mg Cap; TAKE 1 CAPSULE BY MOUTH NIGHTLY AS NEEDED.  Dispense: 90 capsule; Refill: 1      Follow up in about 6 months (around 6/3/2021) for medication management.        12/6/2020 Aleja Bashir

## 2020-12-08 ENCOUNTER — OFFICE VISIT (OUTPATIENT)
Dept: FAMILY MEDICINE | Facility: CLINIC | Age: 35
End: 2020-12-08
Payer: COMMERCIAL

## 2020-12-08 ENCOUNTER — PATIENT MESSAGE (OUTPATIENT)
Dept: FAMILY MEDICINE | Facility: CLINIC | Age: 35
End: 2020-12-08

## 2020-12-08 ENCOUNTER — HOSPITAL ENCOUNTER (OUTPATIENT)
Dept: RADIOLOGY | Facility: HOSPITAL | Age: 35
Discharge: HOME | End: 2020-12-08
Attending: NURSE PRACTITIONER
Payer: COMMERCIAL

## 2020-12-08 VITALS
SYSTOLIC BLOOD PRESSURE: 124 MMHG | OXYGEN SATURATION: 100 % | BODY MASS INDEX: 24.8 KG/M2 | DIASTOLIC BLOOD PRESSURE: 72 MMHG | HEART RATE: 64 BPM | WEIGHT: 140 LBS | HEIGHT: 63 IN

## 2020-12-08 DIAGNOSIS — Z82.49 FAMILY HISTORY OF HEART DISEASE: ICD-10-CM

## 2020-12-08 DIAGNOSIS — F32.A DEPRESSION, UNSPECIFIED DEPRESSION TYPE: ICD-10-CM

## 2020-12-08 DIAGNOSIS — E10.65 UNCONTROLLED TYPE 1 DIABETES MELLITUS WITH HYPERGLYCEMIA: ICD-10-CM

## 2020-12-08 DIAGNOSIS — Z79.899 HIGH RISK MEDICATION USE: ICD-10-CM

## 2020-12-08 DIAGNOSIS — R06.00 DYSPNEA, UNSPECIFIED TYPE: ICD-10-CM

## 2020-12-08 DIAGNOSIS — G47.00 INSOMNIA, UNSPECIFIED TYPE: ICD-10-CM

## 2020-12-08 DIAGNOSIS — F41.9 ANXIETY: ICD-10-CM

## 2020-12-08 DIAGNOSIS — R06.00 DYSPNEA, UNSPECIFIED TYPE: Primary | ICD-10-CM

## 2020-12-08 PROCEDURE — 71046 X-RAY EXAM CHEST 2 VIEWS: CPT | Mod: TC,PO

## 2020-12-08 PROCEDURE — 3008F BODY MASS INDEX DOCD: CPT | Mod: S$GLB,,, | Performed by: NURSE PRACTITIONER

## 2020-12-08 PROCEDURE — 3052F HG A1C>EQUAL 8.0%<EQUAL 9.0%: CPT | Mod: S$GLB,,, | Performed by: NURSE PRACTITIONER

## 2020-12-08 PROCEDURE — 99214 PR OFFICE/OUTPT VISIT, EST, LEVL IV, 30-39 MIN: ICD-10-PCS | Mod: S$GLB,,, | Performed by: NURSE PRACTITIONER

## 2020-12-08 PROCEDURE — 3052F PR MOST RECENT HEMOGLOBIN A1C LEVEL 8.0 - < 9.0%: ICD-10-PCS | Mod: S$GLB,,, | Performed by: NURSE PRACTITIONER

## 2020-12-08 PROCEDURE — 3008F PR BODY MASS INDEX (BMI) DOCUMENTED: ICD-10-PCS | Mod: S$GLB,,, | Performed by: NURSE PRACTITIONER

## 2020-12-08 PROCEDURE — 99214 OFFICE O/P EST MOD 30 MIN: CPT | Mod: S$GLB,,, | Performed by: NURSE PRACTITIONER

## 2020-12-08 NOTE — PROGRESS NOTES
SUBJECTIVE:    Patient ID: Marah Robert is a 35 y.o. female.    Chief Complaint: Shortness of Breath (and pain on left side, hard to take a deep breath x2 days, no bottles, went over meds verbally// SW)    35 year old female presents for sick visit. Reports that started experiencing some pressure in chest about 3-4 days ago. Denies palpitations. Does report that sometimes has some shortness of breath. Starts in epigastric area and radiates wot left chest wall. Does not change with meals.   Recently established care with endocrine for dm 1. Sugars have improved. Does have some stressors. Recently started ssri. Sleeping better. Has referral set up with cardiology. No fever. Occasional cough.       Office Visit on 12/08/2020   Component Date Value Ref Range Status    D-Dimer, Quant 12/08/2020 0.21  <0.50 mcg/mL FEU Final    WBC 12/08/2020 6.6  3.8 - 10.8 Thousand/uL Final    RBC 12/08/2020 4.70  3.80 - 5.10 Million/uL Final    Hemoglobin 12/08/2020 13.7  11.7 - 15.5 g/dL Final    Hematocrit 12/08/2020 41.2  35.0 - 45.0 % Final    MCV 12/08/2020 87.7  80.0 - 100.0 fL Final    MCH 12/08/2020 29.1  27.0 - 33.0 pg Final    MCHC 12/08/2020 33.3  32.0 - 36.0 g/dL Final    RDW 12/08/2020 12.7  11.0 - 15.0 % Final    Platelets 12/08/2020 319  140 - 400 Thousand/uL Final    MPV 12/08/2020 12.2  7.5 - 12.5 fL Final    Neutrophils Absolute 12/08/2020 3,736  1,500 - 7,800 cells/uL Final    Lymph # 12/08/2020 2,198  850 - 3,900 cells/uL Final    Mono # 12/08/2020 436  200 - 950 cells/uL Final    Eos # 12/08/2020 178  15 - 500 cells/uL Final    Baso # 12/08/2020 53  0 - 200 cells/uL Final    Neutrophils Relative 12/08/2020 56.6  % Final    Lymph % 12/08/2020 33.3  % Final    Mono % 12/08/2020 6.6  % Final    Eosinophil % 12/08/2020 2.7  % Final    Basophil % 12/08/2020 0.8  % Final   Telephone on 10/19/2020   Component Date Value Ref Range Status    WBC 11/03/2020 6.1  3.4 - 10.8 x10E3/uL Final    RBC  11/03/2020 5.07  3.77 - 5.28 x10E6/uL Final    Hemoglobin 11/03/2020 13.9  11.1 - 15.9 g/dL Final    Hematocrit 11/03/2020 45.5  34.0 - 46.6 % Final    MCV 11/03/2020 90  79 - 97 fL Final    MCH 11/03/2020 27.4  26.6 - 33.0 pg Final    MCHC 11/03/2020 30.5* 31.5 - 35.7 g/dL Final    RDW 11/03/2020 12.9  11.7 - 15.4 % Final    Platelets 11/03/2020 300  150 - 450 x10E3/uL Final    Neutrophils 11/03/2020 54  Not Estab. % Final    Lymphs 11/03/2020 35  Not Estab. % Final    Monocytes 11/03/2020 7  Not Estab. % Final    Eos 11/03/2020 3  Not Estab. % Final    Basos 11/03/2020 1  Not Estab. % Final    Neutrophils (Absolute) 11/03/2020 3.3  1.4 - 7.0 x10E3/uL Final    Lymphs (Absolute) 11/03/2020 2.1  0.7 - 3.1 x10E3/uL Final    Monocytes(Absolute) 11/03/2020 0.5  0.1 - 0.9 x10E3/uL Final    Eos (Absolute) 11/03/2020 0.2  0.0 - 0.4 x10E3/uL Final    Baso (Absolute) 11/03/2020 0.0  0.0 - 0.2 x10E3/uL Final    Immature Granulocytes 11/03/2020 0  Not Estab. % Final    Immature Grans (Abs) 11/03/2020 0.0  0.0 - 0.1 x10E3/uL Final    Glucose 11/03/2020 226* 65 - 99 mg/dL Final    BUN 11/03/2020 8  6 - 20 mg/dL Final    Creatinine 11/03/2020 0.67  0.57 - 1.00 mg/dL Final    eGFR if non African American 11/03/2020 113  >59 mL/min/1.73 Final    eGFR if African American 11/03/2020 130  >59 mL/min/1.73 Final    BUN/Creatinine Ratio 11/03/2020 12  9 - 23 Final    Sodium 11/03/2020 136  134 - 144 mmol/L Final    Potassium 11/03/2020 4.7  3.5 - 5.2 mmol/L Final    Chloride 11/03/2020 101  96 - 106 mmol/L Final    CO2 11/03/2020 25  20 - 29 mmol/L Final    Calcium 11/03/2020 9.3  8.7 - 10.2 mg/dL Final    Protein, Total 11/03/2020 6.8  6.0 - 8.5 g/dL Final    Albumin 11/03/2020 3.8  3.8 - 4.8 g/dL Final    Globulin, Total 11/03/2020 3.0  1.5 - 4.5 g/dL Final    Albumin/Globulin Ratio 11/03/2020 1.3  1.2 - 2.2 Final    Total Bilirubin 11/03/2020 0.3  0.0 - 1.2 mg/dL Final    Alkaline Phosphatase  11/03/2020 60  39 - 117 IU/L Final    AST 11/03/2020 14  0 - 40 IU/L Final    ALT 11/03/2020 14  0 - 32 IU/L Final    Cholesterol 11/03/2020 187  100 - 199 mg/dL Final    Triglycerides 11/03/2020 96  0 - 149 mg/dL Final    HDL 11/03/2020 89  >39 mg/dL Final    VLDL Cholesterol Jurgen 11/03/2020 17  5 - 40 mg/dL Final    LDL Calculated 11/03/2020 81  0 - 99 mg/dL Final    TSH 11/03/2020 1.980  0.450 - 4.500 uIU/mL Final    Creatinine, Urine 11/03/2020 44.6  Not Estab. mg/dL Final    Microalb, Ur 11/03/2020 <3.0  Not Estab. ug/mL Final    Microalb/Crt. Ratio 11/03/2020 <7  0 - 29 mg/g creat Final    Specific Jackson, UA 11/03/2020 1.012  1.005 - 1.030 Final    pH, UA 11/03/2020 6.5  5.0 - 7.5 Final    Color, UA 11/03/2020 Yellow  Yellow Final    Clarity, UA 11/03/2020 Clear  Clear Final    Leukocytes, UA 11/03/2020 Negative  Negative Final    Protein, UA 11/03/2020 Negative  Negative/Trace Final    Glucose, UA 11/03/2020 3+* Negative Final    Ketones, UA 11/03/2020 Negative  Negative Final    Occult Blood UA 11/03/2020 Negative  Negative Final    Bilirubin, UA 11/03/2020 Negative  Negative Final    Urobilinogen, UA 11/03/2020 0.2  0.2 - 1.0 mg/dL Final    Nitrite, UA 11/03/2020 Negative  Negative Final    Microscopic Examination 11/03/2020 Comment   Final    Microscopic Examination 11/03/2020 See below:   Final    Urinalysis Reflex 11/03/2020 Comment   Final    VITAMIN D,1,25-DIHYDROXY 11/03/2020 63  pg/mL Final    VITAMIN D-2 1,25-DIHYDROXY 11/03/2020 <10  pg/mL Final    Vitamin D3, 1,25 (OH)2 11/03/2020 62  pg/mL Final    WBC, UA 11/03/2020 0-5  0 - 5 /hpf Final    RBC, UA 11/03/2020 None seen  0 - 2 /hpf Final    Epithelial Cells (non renal) 11/03/2020 0-10  0 - 10 /hpf Final    Mucus, UA 11/03/2020 Present  Not Estab. Final    Bacteria, UA 11/03/2020 Few  None seen/Few Final   Office Visit on 10/15/2020   Component Date Value Ref Range Status    Hemoglobin A1C 10/16/2020 8.5  %  Final   Lab Visit on 07/16/2020   Component Date Value Ref Range Status    SARS-CoV-2 RNA, Amplification, Qual 07/16/2020 Negative   Final   Lab Visit on 07/08/2020   Component Date Value Ref Range Status    SARS-CoV2 (COVID-19) Qualitative P* 07/08/2020 Not Detected   Final       Past Medical History:   Diagnosis Date    Diabetes mellitus type I     Eustachian tube dysfunction     Vitamin D deficiency      Social History     Socioeconomic History    Marital status:      Spouse name: Not on file    Number of children: Not on file    Years of education: Not on file    Highest education level: Not on file   Occupational History    Occupation: data entery   Social Needs    Financial resource strain: Not very hard    Food insecurity     Worry: Never true     Inability: Never true    Transportation needs     Medical: No     Non-medical: No   Tobacco Use    Smoking status: Former Smoker    Smokeless tobacco: Never Used   Substance and Sexual Activity    Alcohol use: No     Frequency: Monthly or less     Drinks per session: 1 or 2     Binge frequency: Never    Drug use: No    Sexual activity: Yes     Partners: Male     Birth control/protection: None, See Surgical Hx   Lifestyle    Physical activity     Days per week: 1 day     Minutes per session: Not on file    Stress: To some extent   Relationships    Social connections     Talks on phone: More than three times a week     Gets together: Once a week     Attends Advent service: Not on file     Active member of club or organization: Yes     Attends meetings of clubs or organizations: Never     Relationship status:    Other Topics Concern    Not on file   Social History Narrative    Not on file     Past Surgical History:   Procedure Laterality Date    ABLATION      LASER ABLATION      TUBAL LIGATION       Family History   Problem Relation Age of Onset    COPD Mother     Heart disease Mother     Hypertension Mother     Headaches  "Mother     Depression Mother     No Known Problems Father        Review of patient's allergies indicates:   Allergen Reactions    Lisinopril        Current Outpatient Medications:     BASAGLAR KWIKPEN U-100 INSULIN glargine 100 units/mL (3mL) SubQ pen, Inject 35 Units into the skin once daily., Disp: 3 mL, Rfl: 3    BD ULTRA-FINE MINI PEN NEEDLE 31 gauge x 3/16" Ndle, USE TO INJECT 4 TIMES A DAY, Disp: , Rfl:     blood-glucose meter,continuous (DEXCOM G6 ) Misc, Dispense 1 , Disp: 1 each, Rfl: 0    blood-glucose sensor (DEXCOM G6 SENSOR) Lisa, Dispense 3 sensors/month, Disp: 3 Device, Rfl: 12    blood-glucose transmitter (DEXCOM G6 TRANSMITTER) Lisa, Dispense 1 transmitter, Disp: 1 Device, Rfl: 3    citalopram (CELEXA) 10 MG tablet, Take 1 tablet (10 mg total) by mouth once daily., Disp: 30 tablet, Rfl: 11    ENSKYCE 0.15-0.03 mg per tablet, , Disp: , Rfl:     insulin lispro (HUMALOG KWIKPEN INSULIN) 100 unit/mL pen, Carb ratio 1:15, ISF 40, Max TDD 30u, Disp: 15 mL, Rfl: 12    ONETOUCH ULTRA BLUE TEST STRIP Strp, TEST 4 TIMES DAILY, Disp: , Rfl:     pen needle, diabetic 32 gauge x 5/32" Ndle, Uses 4 daily, Disp: 150 each, Rfl: 12    temazepam (RESTORIL) 15 mg Cap, TAKE 1 CAPSULE BY MOUTH NIGHTLY AS NEEDED., Disp: 90 capsule, Rfl: 1    Review of Systems   Constitutional: Negative for chills, fever and unexpected weight change.   HENT: Negative for ear pain, rhinorrhea and sore throat.    Eyes: Negative for pain.   Respiratory: Positive for chest tightness and shortness of breath. Negative for cough.    Cardiovascular: Positive for chest pain. Negative for palpitations and leg swelling.   Gastrointestinal: Negative for abdominal pain, diarrhea, nausea and vomiting.   Genitourinary: Negative for hematuria and vaginal bleeding.   Musculoskeletal: Negative for arthralgias.   Skin: Negative for rash.   Neurological: Negative for weakness.   Psychiatric/Behavioral: Negative for agitation and " "sleep disturbance. The patient is not nervous/anxious.           Objective:      Vitals:    12/08/20 1250   BP: 124/72   Pulse: 64   SpO2: 100%   Weight: 63.5 kg (140 lb)   Height: 5' 3" (1.6 m)     Physical Exam  Constitutional:       Appearance: She is well-developed.   Eyes:      Conjunctiva/sclera: Conjunctivae normal.      Pupils: Pupils are equal, round, and reactive to light.   Neck:      Musculoskeletal: Normal range of motion and neck supple.      Vascular: No JVD.   Cardiovascular:      Rate and Rhythm: Normal rate and regular rhythm.      Heart sounds: No murmur.      Comments: Pulse ox 99. Mild chest wall palpation tenderness to left chest wall.   Pulmonary:      Effort: Pulmonary effort is normal.      Breath sounds: Normal breath sounds.   Abdominal:      General: Bowel sounds are normal.      Palpations: Abdomen is soft.   Musculoskeletal: Normal range of motion.         General: No deformity.   Lymphadenopathy:      Cervical: No cervical adenopathy.   Skin:     General: Skin is warm and dry.      Findings: No rash.   Neurological:      Mental Status: She is alert and oriented to person, place, and time.      Gait: Gait normal.   Psychiatric:         Speech: Speech normal.         Behavior: Behavior normal.           Assessment:       1. Dyspnea, unspecified type    2. Uncontrolled type 1 diabetes mellitus with hyperglycemia    3. Depression, unspecified depression type    4. High risk medication use    5. Anxiety    6. Insomnia, unspecified type    7. Family history of heart disease         Plan:       Dyspnea, unspecified type  -     D-Dimer, Quantitative; Future; Expected date: 12/08/2020  -     CBC Auto Differential; Future; Expected date: 12/08/2020  -     X-Ray Chest PA And Lateral; Future; Expected date: 12/08/2020  -     POCT EKG 12-LEAD (NOT FOR [facility] USE)    Uncontrolled type 1 diabetes mellitus with hyperglycemia    Depression, unspecified depression type    High risk medication " use    Anxiety    Insomnia, unspecified type    Family history of heart disease      Follow up in about 4 weeks (around 1/5/2021) for medication management.        12/13/2020 Aleja Bashir

## 2020-12-09 ENCOUNTER — PATIENT MESSAGE (OUTPATIENT)
Dept: DIABETES | Facility: CLINIC | Age: 35
End: 2020-12-09

## 2020-12-09 ENCOUNTER — PATIENT MESSAGE (OUTPATIENT)
Dept: FAMILY MEDICINE | Facility: CLINIC | Age: 35
End: 2020-12-09

## 2020-12-09 ENCOUNTER — TELEPHONE (OUTPATIENT)
Dept: FAMILY MEDICINE | Facility: CLINIC | Age: 35
End: 2020-12-09

## 2020-12-09 DIAGNOSIS — R00.2 PALPITATIONS: ICD-10-CM

## 2020-12-09 DIAGNOSIS — R06.00 DYSPNEA, UNSPECIFIED TYPE: Primary | ICD-10-CM

## 2020-12-09 LAB
BASOPHILS # BLD AUTO: 53 CELLS/UL (ref 0–200)
BASOPHILS NFR BLD AUTO: 0.8 %
D DIMER PPP FEU-MCNC: 0.21 MCG/ML FEU
EOSINOPHIL # BLD AUTO: 178 CELLS/UL (ref 15–500)
EOSINOPHIL NFR BLD AUTO: 2.7 %
ERYTHROCYTE [DISTWIDTH] IN BLOOD BY AUTOMATED COUNT: 12.7 % (ref 11–15)
HCT VFR BLD AUTO: 41.2 % (ref 35–45)
HGB BLD-MCNC: 13.7 G/DL (ref 11.7–15.5)
LYMPHOCYTES # BLD AUTO: 2198 CELLS/UL (ref 850–3900)
LYMPHOCYTES NFR BLD AUTO: 33.3 %
MCH RBC QN AUTO: 29.1 PG (ref 27–33)
MCHC RBC AUTO-ENTMCNC: 33.3 G/DL (ref 32–36)
MCV RBC AUTO: 87.7 FL (ref 80–100)
MONOCYTES # BLD AUTO: 436 CELLS/UL (ref 200–950)
MONOCYTES NFR BLD AUTO: 6.6 %
NEUTROPHILS # BLD AUTO: 3736 CELLS/UL (ref 1500–7800)
NEUTROPHILS NFR BLD AUTO: 56.6 %
PLATELET # BLD AUTO: 319 THOUSAND/UL (ref 140–400)
PMV BLD REES-ECKER: 12.2 FL (ref 7.5–12.5)
RBC # BLD AUTO: 4.7 MILLION/UL (ref 3.8–5.1)
WBC # BLD AUTO: 6.6 THOUSAND/UL (ref 3.8–10.8)

## 2020-12-09 NOTE — PROGRESS NOTES
Spoke to patient with information per Aleja. States she is still with chest heaviness. Informed her to call [facility] around 4pm today to schedule echo, gave her their number. Please send order.

## 2020-12-09 NOTE — TELEPHONE ENCOUNTER
Spoke to patient with information per Aleja. States she is still with chest heaviness. Informed her to call [facility] around 4pm today to schedule echo, gave her their number. Please send order.

## 2020-12-09 NOTE — TELEPHONE ENCOUNTER
----- Message from Aleja Bashir NP sent at 12/9/2020 12:57 PM CST -----  Labs are negative. I would like her to have an echo done on your heart. How are you feeling today?

## 2020-12-10 ENCOUNTER — PATIENT MESSAGE (OUTPATIENT)
Dept: FAMILY MEDICINE | Facility: CLINIC | Age: 35
End: 2020-12-10

## 2020-12-10 NOTE — TELEPHONE ENCOUNTER
Pt stated she has been busy at work that she would give them a call now to get an appt scheduled.

## 2020-12-18 ENCOUNTER — PATIENT MESSAGE (OUTPATIENT)
Dept: FAMILY MEDICINE | Facility: CLINIC | Age: 35
End: 2020-12-18

## 2020-12-21 NOTE — TELEPHONE ENCOUNTER
Spoke to pt regarding message to get a little more info regarding her pt portal message. Pt is wanting to know if the celexa can make her feel irritable. Pt stated she just wants to cry lately, and everything is starting to get on her nerves. Pt stated she is taking celexa 5mg daily     Please advise.

## 2020-12-23 ENCOUNTER — CLINICAL SUPPORT (OUTPATIENT)
Dept: CARDIOLOGY | Facility: HOSPITAL | Age: 35
End: 2020-12-23
Attending: NURSE PRACTITIONER
Payer: COMMERCIAL

## 2020-12-23 VITALS — HEIGHT: 63 IN | WEIGHT: 140 LBS | BODY MASS INDEX: 24.8 KG/M2

## 2020-12-23 DIAGNOSIS — R00.2 PALPITATIONS: ICD-10-CM

## 2020-12-23 DIAGNOSIS — R06.00 DYSPNEA, UNSPECIFIED TYPE: ICD-10-CM

## 2020-12-23 PROCEDURE — 93306 TTE W/DOPPLER COMPLETE: CPT

## 2020-12-23 PROCEDURE — 93306 TTE W/DOPPLER COMPLETE: CPT | Mod: 26,,, | Performed by: INTERNAL MEDICINE

## 2020-12-23 PROCEDURE — 93306 ECHO (CUPID ONLY): ICD-10-PCS | Mod: 26,,, | Performed by: INTERNAL MEDICINE

## 2020-12-24 LAB
AORTIC ROOT ANNULUS: 2.12 CM
AORTIC VALVE CUSP SEPERATION: 1.65 CM
AV INDEX (PROSTH): 1.04
AV MEAN GRADIENT: 4 MMHG
AV PEAK GRADIENT: 6 MMHG
AV VALVE AREA: 3.27 CM2
AV VELOCITY RATIO: 96.5
BSA FOR ECHO PROCEDURE: 1.68 M2
CV ECHO LV RWT: 0.32 CM
DOP CALC AO PEAK VEL: 1.27 M/S
DOP CALC AO VTI: 25.91 CM
DOP CALC LVOT AREA: 3.1 CM2
DOP CALC LVOT DIAMETER: 2 CM
DOP CALC LVOT PEAK VEL: 122.55 M/S
DOP CALC LVOT STROKE VOLUME: 84.78 CM3
DOP CALCLVOT PEAK VEL VTI: 27 CM
E WAVE DECELERATION TIME: 149.18 MSEC
E/A RATIO: 1.51
E/E' RATIO: 5.68 M/S
ECHO LV POSTERIOR WALL: 0.72 CM (ref 0.6–1.1)
FRACTIONAL SHORTENING: 37 % (ref 28–44)
INTERVENTRICULAR SEPTUM: 0.71 CM (ref 0.6–1.1)
IVRT: 111.88 MSEC
LEFT INTERNAL DIMENSION IN SYSTOLE: 2.82 CM (ref 2.1–4)
LEFT VENTRICLE MASS INDEX: 59 G/M2
LEFT VENTRICULAR INTERNAL DIMENSION IN DIASTOLE: 4.5 CM (ref 3.5–6)
LEFT VENTRICULAR MASS: 98.28 G
LV LATERAL E/E' RATIO: 5.92 M/S
LV SEPTAL E/E' RATIO: 5.46 M/S
MV PEAK A VEL: 0.47 M/S
MV PEAK E VEL: 0.71 M/S
PISA TR MAX VEL: 2.42 M/S
PV PEAK VELOCITY: 99.73 CM/S
RA PRESSURE: 3 MMHG
RIGHT VENTRICULAR END-DIASTOLIC DIMENSION: 246 CM
TDI LATERAL: 0.12 M/S
TDI SEPTAL: 0.13 M/S
TDI: 0.13 M/S
TR MAX PG: 23 MMHG
TV REST PULMONARY ARTERY PRESSURE: 26 MMHG

## 2020-12-29 ENCOUNTER — PATIENT MESSAGE (OUTPATIENT)
Dept: FAMILY MEDICINE | Facility: CLINIC | Age: 35
End: 2020-12-29

## 2020-12-29 NOTE — TELEPHONE ENCOUNTER
Let her know overall heart function is normal. There is mild tricuspid regurgitation which means valve doesn't close completely but all chambers are normal size so likely insignificant and no treatment needed.

## 2021-01-04 ENCOUNTER — PATIENT MESSAGE (OUTPATIENT)
Dept: ENDOCRINOLOGY | Facility: CLINIC | Age: 36
End: 2021-01-04

## 2021-01-05 ENCOUNTER — TELEPHONE (OUTPATIENT)
Dept: FAMILY MEDICINE | Facility: CLINIC | Age: 36
End: 2021-01-05

## 2021-01-06 RX ORDER — INSULIN GLARGINE 100 [IU]/ML
35 INJECTION, SOLUTION SUBCUTANEOUS DAILY
Qty: 30 ML | Refills: 3 | Status: SHIPPED | OUTPATIENT
Start: 2021-01-06 | End: 2021-01-07

## 2021-01-07 ENCOUNTER — TELEPHONE (OUTPATIENT)
Dept: ENDOCRINOLOGY | Facility: CLINIC | Age: 36
End: 2021-01-07

## 2021-01-07 ENCOUNTER — PATIENT MESSAGE (OUTPATIENT)
Dept: ENDOCRINOLOGY | Facility: CLINIC | Age: 36
End: 2021-01-07

## 2021-01-07 DIAGNOSIS — E10.65 UNCONTROLLED TYPE 1 DIABETES MELLITUS WITH HYPERGLYCEMIA: Primary | ICD-10-CM

## 2021-01-07 RX ORDER — INSULIN GLARGINE 300 U/ML
36 INJECTION, SOLUTION SUBCUTANEOUS DAILY
Qty: 9 ML | Refills: 12 | Status: SHIPPED | OUTPATIENT
Start: 2021-01-07 | End: 2021-04-28

## 2021-01-13 ENCOUNTER — TELEPHONE (OUTPATIENT)
Dept: ENDOCRINOLOGY | Facility: CLINIC | Age: 36
End: 2021-01-13

## 2021-02-05 ENCOUNTER — LAB VISIT (OUTPATIENT)
Dept: LAB | Facility: HOSPITAL | Age: 36
End: 2021-02-05
Attending: NURSE PRACTITIONER
Payer: COMMERCIAL

## 2021-02-05 DIAGNOSIS — E10.65 UNCONTROLLED TYPE 1 DIABETES MELLITUS WITH HYPERGLYCEMIA: ICD-10-CM

## 2021-02-05 LAB
25(OH)D3+25(OH)D2 SERPL-MCNC: 25 NG/ML (ref 30–96)
ESTIMATED AVG GLUCOSE: 194 MG/DL (ref 68–131)
HBA1C MFR BLD: 8.4 % (ref 4–5.6)

## 2021-02-05 PROCEDURE — 83036 HEMOGLOBIN GLYCOSYLATED A1C: CPT

## 2021-02-05 PROCEDURE — 82306 VITAMIN D 25 HYDROXY: CPT

## 2021-02-05 PROCEDURE — 36415 COLL VENOUS BLD VENIPUNCTURE: CPT | Mod: PO

## 2021-02-19 RX ORDER — TEMAZEPAM 15 MG/1
CAPSULE ORAL
Qty: 90 CAPSULE | Refills: 1 | Status: SHIPPED | OUTPATIENT
Start: 2021-02-19 | End: 2023-01-30

## 2021-03-18 ENCOUNTER — OFFICE VISIT (OUTPATIENT)
Dept: ENDOCRINOLOGY | Facility: CLINIC | Age: 36
End: 2021-03-18
Payer: COMMERCIAL

## 2021-03-18 VITALS
OXYGEN SATURATION: 97 % | BODY MASS INDEX: 25.08 KG/M2 | HEIGHT: 63 IN | SYSTOLIC BLOOD PRESSURE: 110 MMHG | DIASTOLIC BLOOD PRESSURE: 70 MMHG | TEMPERATURE: 97 F | WEIGHT: 141.56 LBS | HEART RATE: 89 BPM

## 2021-03-18 DIAGNOSIS — E10.65 UNCONTROLLED TYPE 1 DIABETES MELLITUS WITH HYPERGLYCEMIA: Primary | ICD-10-CM

## 2021-03-18 DIAGNOSIS — E55.9 VITAMIN D DEFICIENCY: ICD-10-CM

## 2021-03-18 DIAGNOSIS — Z82.49 FAMILY HISTORY OF HEART DISEASE: ICD-10-CM

## 2021-03-18 PROCEDURE — 3008F PR BODY MASS INDEX (BMI) DOCUMENTED: ICD-10-PCS | Mod: CPTII,S$GLB,, | Performed by: NURSE PRACTITIONER

## 2021-03-18 PROCEDURE — 99999 PR PBB SHADOW E&M-EST. PATIENT-LVL III: CPT | Mod: PBBFAC,,, | Performed by: NURSE PRACTITIONER

## 2021-03-18 PROCEDURE — 3008F BODY MASS INDEX DOCD: CPT | Mod: CPTII,S$GLB,, | Performed by: NURSE PRACTITIONER

## 2021-03-18 PROCEDURE — 99214 OFFICE O/P EST MOD 30 MIN: CPT | Mod: S$GLB,,, | Performed by: NURSE PRACTITIONER

## 2021-03-18 PROCEDURE — 1126F PR PAIN SEVERITY QUANTIFIED, NO PAIN PRESENT: ICD-10-PCS | Mod: S$GLB,,, | Performed by: NURSE PRACTITIONER

## 2021-03-18 PROCEDURE — 3052F PR MOST RECENT HEMOGLOBIN A1C LEVEL 8.0 - < 9.0%: ICD-10-PCS | Mod: CPTII,S$GLB,, | Performed by: NURSE PRACTITIONER

## 2021-03-18 PROCEDURE — 99999 PR PBB SHADOW E&M-EST. PATIENT-LVL III: ICD-10-PCS | Mod: PBBFAC,,, | Performed by: NURSE PRACTITIONER

## 2021-03-18 PROCEDURE — 99214 PR OFFICE/OUTPT VISIT, EST, LEVL IV, 30-39 MIN: ICD-10-PCS | Mod: S$GLB,,, | Performed by: NURSE PRACTITIONER

## 2021-03-18 PROCEDURE — 1126F AMNT PAIN NOTED NONE PRSNT: CPT | Mod: S$GLB,,, | Performed by: NURSE PRACTITIONER

## 2021-03-18 PROCEDURE — 3052F HG A1C>EQUAL 8.0%<EQUAL 9.0%: CPT | Mod: CPTII,S$GLB,, | Performed by: NURSE PRACTITIONER

## 2021-03-18 RX ORDER — BLOOD-GLUCOSE SENSOR
EACH MISCELLANEOUS
Qty: 3 DEVICE | Refills: 12 | Status: SHIPPED | OUTPATIENT
Start: 2021-03-18 | End: 2021-10-27 | Stop reason: SDUPTHER

## 2021-03-18 RX ORDER — BLOOD-GLUCOSE,RECEIVER,CONT
EACH MISCELLANEOUS
Qty: 1 EACH | Refills: 0 | Status: SHIPPED | OUTPATIENT
Start: 2021-03-18

## 2021-03-18 RX ORDER — BLOOD-GLUCOSE TRANSMITTER
EACH MISCELLANEOUS
Qty: 1 DEVICE | Refills: 3 | Status: SHIPPED | OUTPATIENT
Start: 2021-03-18 | End: 2021-10-27 | Stop reason: SDUPTHER

## 2021-03-22 ENCOUNTER — TELEPHONE (OUTPATIENT)
Dept: PHARMACY | Facility: CLINIC | Age: 36
End: 2021-03-22

## 2021-03-24 ENCOUNTER — CLINICAL SUPPORT (OUTPATIENT)
Dept: DIABETES | Facility: CLINIC | Age: 36
End: 2021-03-24
Payer: COMMERCIAL

## 2021-03-24 VITALS — WEIGHT: 141.56 LBS | HEIGHT: 63 IN | BODY MASS INDEX: 25.08 KG/M2

## 2021-03-24 DIAGNOSIS — E10.65 UNCONTROLLED TYPE 1 DIABETES MELLITUS WITH HYPERGLYCEMIA: ICD-10-CM

## 2021-03-24 PROCEDURE — 99999 PR PBB SHADOW E&M-EST. PATIENT-LVL II: ICD-10-PCS | Mod: PBBFAC,,, | Performed by: DIETITIAN, REGISTERED

## 2021-03-24 PROCEDURE — G0108 PR DIAB MANAGE TRN  PER INDIV: ICD-10-PCS | Mod: S$GLB,,, | Performed by: DIETITIAN, REGISTERED

## 2021-03-24 PROCEDURE — G0108 DIAB MANAGE TRN  PER INDIV: HCPCS | Mod: S$GLB,,, | Performed by: DIETITIAN, REGISTERED

## 2021-03-24 PROCEDURE — 99999 PR PBB SHADOW E&M-EST. PATIENT-LVL II: CPT | Mod: PBBFAC,,, | Performed by: DIETITIAN, REGISTERED

## 2021-03-30 ENCOUNTER — PATIENT MESSAGE (OUTPATIENT)
Dept: DIABETES | Facility: CLINIC | Age: 36
End: 2021-03-30

## 2021-04-25 ENCOUNTER — HOSPITAL ENCOUNTER (EMERGENCY)
Facility: HOSPITAL | Age: 36
Discharge: HOME | End: 2021-04-25
Attending: EMERGENCY MEDICINE
Payer: COMMERCIAL

## 2021-04-25 VITALS
BODY MASS INDEX: 25.33 KG/M2 | HEART RATE: 70 BPM | WEIGHT: 143 LBS | TEMPERATURE: 98 F | RESPIRATION RATE: 16 BRPM | OXYGEN SATURATION: 99 % | SYSTOLIC BLOOD PRESSURE: 120 MMHG | DIASTOLIC BLOOD PRESSURE: 62 MMHG

## 2021-04-25 DIAGNOSIS — Z86.39 HISTORY OF DIABETES MELLITUS, TYPE I: ICD-10-CM

## 2021-04-25 DIAGNOSIS — E16.2 HYPOGLYCEMIA: Primary | ICD-10-CM

## 2021-04-25 LAB
ALBUMIN SERPL BCP-MCNC: 3.1 G/DL (ref 3.5–5.2)
ALP SERPL-CCNC: 33 U/L (ref 55–135)
ALT SERPL W/O P-5'-P-CCNC: 23 U/L (ref 10–44)
ANION GAP SERPL CALC-SCNC: 9 MMOL/L (ref 8–16)
AST SERPL-CCNC: 15 U/L (ref 10–40)
BASOPHILS # BLD AUTO: 0.03 K/UL (ref 0–0.2)
BASOPHILS NFR BLD: 0.4 % (ref 0–1.9)
BILIRUB SERPL-MCNC: 0.4 MG/DL (ref 0.1–1)
BILIRUB UR QL STRIP: NEGATIVE
BUN SERPL-MCNC: 8 MG/DL (ref 6–20)
CALCIUM SERPL-MCNC: 7.5 MG/DL (ref 8.7–10.5)
CHLORIDE SERPL-SCNC: 110 MMOL/L (ref 95–110)
CLARITY UR: CLEAR
CO2 SERPL-SCNC: 20 MMOL/L (ref 23–29)
COLOR UR: COLORLESS
CREAT SERPL-MCNC: 0.6 MG/DL (ref 0.5–1.4)
DIFFERENTIAL METHOD: NORMAL
EOSINOPHIL # BLD AUTO: 0 K/UL (ref 0–0.5)
EOSINOPHIL NFR BLD: 0.4 % (ref 0–8)
ERYTHROCYTE [DISTWIDTH] IN BLOOD BY AUTOMATED COUNT: 13 % (ref 11.5–14.5)
EST. GFR  (AFRICAN AMERICAN): >60 ML/MIN/1.73 M^2
EST. GFR  (NON AFRICAN AMERICAN): >60 ML/MIN/1.73 M^2
GLUCOSE SERPL-MCNC: 160 MG/DL (ref 70–110)
GLUCOSE SERPL-MCNC: 242 MG/DL (ref 70–110)
GLUCOSE SERPL-MCNC: 285 MG/DL (ref 70–110)
GLUCOSE UR QL STRIP: ABNORMAL
HCT VFR BLD AUTO: 37.6 % (ref 37–48.5)
HGB BLD-MCNC: 12.5 G/DL (ref 12–16)
HGB UR QL STRIP: NEGATIVE
IMM GRANULOCYTES # BLD AUTO: 0.04 K/UL (ref 0–0.04)
IMM GRANULOCYTES NFR BLD AUTO: 0.5 % (ref 0–0.5)
KETONES UR QL STRIP: NEGATIVE
LACTATE SERPL-SCNC: 1.4 MMOL/L (ref 0.5–1.9)
LACTATE SERPL-SCNC: 2.3 MMOL/L (ref 0.5–1.9)
LEUKOCYTE ESTERASE UR QL STRIP: NEGATIVE
LYMPHOCYTES # BLD AUTO: 1.7 K/UL (ref 1–4.8)
LYMPHOCYTES NFR BLD: 20.2 % (ref 18–48)
MCH RBC QN AUTO: 29.3 PG (ref 27–31)
MCHC RBC AUTO-ENTMCNC: 33.2 G/DL (ref 32–36)
MCV RBC AUTO: 88 FL (ref 82–98)
MONOCYTES # BLD AUTO: 0.5 K/UL (ref 0.3–1)
MONOCYTES NFR BLD: 6.5 % (ref 4–15)
NEUTROPHILS # BLD AUTO: 5.9 K/UL (ref 1.8–7.7)
NEUTROPHILS NFR BLD: 72 % (ref 38–73)
NITRITE UR QL STRIP: NEGATIVE
NRBC BLD-RTO: 0 /100 WBC
PH UR STRIP: 6 [PH] (ref 5–8)
PLATELET # BLD AUTO: 284 K/UL (ref 150–450)
PMV BLD AUTO: 11.4 FL (ref 9.2–12.9)
POTASSIUM SERPL-SCNC: 3.8 MMOL/L (ref 3.5–5.1)
PROT SERPL-MCNC: 5.9 G/DL (ref 6–8.4)
PROT UR QL STRIP: NEGATIVE
RBC # BLD AUTO: 4.27 M/UL (ref 4–5.4)
SODIUM SERPL-SCNC: 139 MMOL/L (ref 136–145)
SP GR UR STRIP: 1 (ref 1–1.03)
URN SPEC COLLECT METH UR: ABNORMAL
UROBILINOGEN UR STRIP-ACNC: NEGATIVE EU/DL
WBC # BLD AUTO: 8.25 K/UL (ref 3.9–12.7)

## 2021-04-25 PROCEDURE — 63600175 PHARM REV CODE 636 W HCPCS: Performed by: STUDENT IN AN ORGANIZED HEALTH CARE EDUCATION/TRAINING PROGRAM

## 2021-04-25 PROCEDURE — 81003 URINALYSIS AUTO W/O SCOPE: CPT | Performed by: STUDENT IN AN ORGANIZED HEALTH CARE EDUCATION/TRAINING PROGRAM

## 2021-04-25 PROCEDURE — 80053 COMPREHEN METABOLIC PANEL: CPT | Performed by: STUDENT IN AN ORGANIZED HEALTH CARE EDUCATION/TRAINING PROGRAM

## 2021-04-25 PROCEDURE — 82962 GLUCOSE BLOOD TEST: CPT

## 2021-04-25 PROCEDURE — 93010 ELECTROCARDIOGRAM REPORT: CPT | Mod: ,,, | Performed by: SPECIALIST

## 2021-04-25 PROCEDURE — 99285 EMERGENCY DEPT VISIT HI MDM: CPT | Mod: 25

## 2021-04-25 PROCEDURE — 96375 TX/PRO/DX INJ NEW DRUG ADDON: CPT

## 2021-04-25 PROCEDURE — 96361 HYDRATE IV INFUSION ADD-ON: CPT

## 2021-04-25 PROCEDURE — 25000003 PHARM REV CODE 250: Performed by: STUDENT IN AN ORGANIZED HEALTH CARE EDUCATION/TRAINING PROGRAM

## 2021-04-25 PROCEDURE — 83605 ASSAY OF LACTIC ACID: CPT | Mod: 91 | Performed by: STUDENT IN AN ORGANIZED HEALTH CARE EDUCATION/TRAINING PROGRAM

## 2021-04-25 PROCEDURE — 93010 EKG 12-LEAD: ICD-10-PCS | Mod: ,,, | Performed by: SPECIALIST

## 2021-04-25 PROCEDURE — 85025 COMPLETE CBC W/AUTO DIFF WBC: CPT | Performed by: STUDENT IN AN ORGANIZED HEALTH CARE EDUCATION/TRAINING PROGRAM

## 2021-04-25 PROCEDURE — 93005 ELECTROCARDIOGRAM TRACING: CPT | Performed by: SPECIALIST

## 2021-04-25 PROCEDURE — 96374 THER/PROPH/DIAG INJ IV PUSH: CPT

## 2021-04-25 RX ORDER — ONDANSETRON 2 MG/ML
4 INJECTION INTRAMUSCULAR; INTRAVENOUS
Status: COMPLETED | OUTPATIENT
Start: 2021-04-25 | End: 2021-04-25

## 2021-04-25 RX ADMIN — ONDANSETRON 4 MG: 2 INJECTION INTRAMUSCULAR; INTRAVENOUS at 01:04

## 2021-04-25 RX ADMIN — SODIUM CHLORIDE 500 ML: 0.9 INJECTION, SOLUTION INTRAVENOUS at 03:04

## 2021-04-25 RX ADMIN — HUMAN INSULIN 2 UNITS: 100 INJECTION, SOLUTION SUBCUTANEOUS at 02:04

## 2021-04-26 ENCOUNTER — TELEPHONE (OUTPATIENT)
Dept: ENDOCRINOLOGY | Facility: CLINIC | Age: 36
End: 2021-04-26

## 2021-04-28 ENCOUNTER — OFFICE VISIT (OUTPATIENT)
Dept: ENDOCRINOLOGY | Facility: CLINIC | Age: 36
End: 2021-04-28
Payer: COMMERCIAL

## 2021-04-28 VITALS
TEMPERATURE: 98 F | SYSTOLIC BLOOD PRESSURE: 100 MMHG | BODY MASS INDEX: 25.41 KG/M2 | DIASTOLIC BLOOD PRESSURE: 70 MMHG | HEIGHT: 63 IN | WEIGHT: 143.44 LBS | OXYGEN SATURATION: 98 % | HEART RATE: 73 BPM

## 2021-04-28 DIAGNOSIS — E55.9 VITAMIN D DEFICIENCY: ICD-10-CM

## 2021-04-28 DIAGNOSIS — Z82.49 FAMILY HISTORY OF HEART DISEASE: ICD-10-CM

## 2021-04-28 DIAGNOSIS — E10.65 UNCONTROLLED TYPE 1 DIABETES MELLITUS WITH HYPERGLYCEMIA: Primary | ICD-10-CM

## 2021-04-28 PROCEDURE — 1126F PR PAIN SEVERITY QUANTIFIED, NO PAIN PRESENT: ICD-10-PCS | Mod: S$GLB,,, | Performed by: NURSE PRACTITIONER

## 2021-04-28 PROCEDURE — 95251 CONT GLUC MNTR ANALYSIS I&R: CPT | Mod: S$GLB,,, | Performed by: NURSE PRACTITIONER

## 2021-04-28 PROCEDURE — 99214 PR OFFICE/OUTPT VISIT, EST, LEVL IV, 30-39 MIN: ICD-10-PCS | Mod: S$GLB,,, | Performed by: NURSE PRACTITIONER

## 2021-04-28 PROCEDURE — 99999 PR PBB SHADOW E&M-EST. PATIENT-LVL IV: ICD-10-PCS | Mod: PBBFAC,,, | Performed by: NURSE PRACTITIONER

## 2021-04-28 PROCEDURE — 1126F AMNT PAIN NOTED NONE PRSNT: CPT | Mod: S$GLB,,, | Performed by: NURSE PRACTITIONER

## 2021-04-28 PROCEDURE — 99214 OFFICE O/P EST MOD 30 MIN: CPT | Mod: S$GLB,,, | Performed by: NURSE PRACTITIONER

## 2021-04-28 PROCEDURE — 3008F PR BODY MASS INDEX (BMI) DOCUMENTED: ICD-10-PCS | Mod: CPTII,S$GLB,, | Performed by: NURSE PRACTITIONER

## 2021-04-28 PROCEDURE — 3052F PR MOST RECENT HEMOGLOBIN A1C LEVEL 8.0 - < 9.0%: ICD-10-PCS | Mod: CPTII,S$GLB,, | Performed by: NURSE PRACTITIONER

## 2021-04-28 PROCEDURE — 99999 PR PBB SHADOW E&M-EST. PATIENT-LVL IV: CPT | Mod: PBBFAC,,, | Performed by: NURSE PRACTITIONER

## 2021-04-28 PROCEDURE — 3052F HG A1C>EQUAL 8.0%<EQUAL 9.0%: CPT | Mod: CPTII,S$GLB,, | Performed by: NURSE PRACTITIONER

## 2021-04-28 PROCEDURE — 95251 PR GLUCOSE MONITOR, 72 HOUR, PHYS INTERP: ICD-10-PCS | Mod: S$GLB,,, | Performed by: NURSE PRACTITIONER

## 2021-04-28 PROCEDURE — 3008F BODY MASS INDEX DOCD: CPT | Mod: CPTII,S$GLB,, | Performed by: NURSE PRACTITIONER

## 2021-04-28 RX ORDER — INSULIN GLARGINE 300 U/ML
34 INJECTION, SOLUTION SUBCUTANEOUS DAILY
Qty: 9 ML | Refills: 12
Start: 2021-04-28 | End: 2022-02-14

## 2021-04-28 RX ORDER — GLUCAGON 3 MG/1
POWDER NASAL
Qty: 1 EACH | Refills: 1 | Status: SHIPPED | OUTPATIENT
Start: 2021-04-28

## 2021-05-10 ENCOUNTER — TELEPHONE (OUTPATIENT)
Dept: FAMILY MEDICINE | Facility: CLINIC | Age: 36
End: 2021-05-10

## 2021-07-21 ENCOUNTER — OFFICE VISIT (OUTPATIENT)
Dept: FAMILY MEDICINE | Facility: CLINIC | Age: 36
End: 2021-07-21
Payer: COMMERCIAL

## 2021-07-21 ENCOUNTER — TELEPHONE (OUTPATIENT)
Dept: FAMILY MEDICINE | Facility: CLINIC | Age: 36
End: 2021-07-21

## 2021-07-21 VITALS
DIASTOLIC BLOOD PRESSURE: 78 MMHG | HEART RATE: 86 BPM | HEIGHT: 63 IN | WEIGHT: 138.31 LBS | TEMPERATURE: 99 F | SYSTOLIC BLOOD PRESSURE: 110 MMHG | BODY MASS INDEX: 24.51 KG/M2 | OXYGEN SATURATION: 98 %

## 2021-07-21 DIAGNOSIS — M79.10 MYALGIA: Primary | ICD-10-CM

## 2021-07-21 DIAGNOSIS — R51.9 NONINTRACTABLE HEADACHE, UNSPECIFIED CHRONICITY PATTERN, UNSPECIFIED HEADACHE TYPE: ICD-10-CM

## 2021-07-21 DIAGNOSIS — E10.9 TYPE 1 DIABETES MELLITUS WITHOUT COMPLICATION: ICD-10-CM

## 2021-07-21 PROCEDURE — 3008F BODY MASS INDEX DOCD: CPT | Mod: S$GLB,,, | Performed by: NURSE PRACTITIONER

## 2021-07-21 PROCEDURE — U0005 INFEC AGEN DETEC AMPLI PROBE: HCPCS | Performed by: NURSE PRACTITIONER

## 2021-07-21 PROCEDURE — 99213 PR OFFICE/OUTPT VISIT, EST, LEVL III, 20-29 MIN: ICD-10-PCS | Mod: S$GLB,,, | Performed by: NURSE PRACTITIONER

## 2021-07-21 PROCEDURE — U0003 INFECTIOUS AGENT DETECTION BY NUCLEIC ACID (DNA OR RNA); SEVERE ACUTE RESPIRATORY SYNDROME CORONAVIRUS 2 (SARS-COV-2) (CORONAVIRUS DISEASE [COVID-19]), AMPLIFIED PROBE TECHNIQUE, MAKING USE OF HIGH THROUGHPUT TECHNOLOGIES AS DESCRIBED BY CMS-2020-01-R: HCPCS | Performed by: NURSE PRACTITIONER

## 2021-07-21 PROCEDURE — 3008F PR BODY MASS INDEX (BMI) DOCUMENTED: ICD-10-PCS | Mod: S$GLB,,, | Performed by: NURSE PRACTITIONER

## 2021-07-21 PROCEDURE — 3052F PR MOST RECENT HEMOGLOBIN A1C LEVEL 8.0 - < 9.0%: ICD-10-PCS | Mod: S$GLB,,, | Performed by: NURSE PRACTITIONER

## 2021-07-21 PROCEDURE — 3052F HG A1C>EQUAL 8.0%<EQUAL 9.0%: CPT | Mod: S$GLB,,, | Performed by: NURSE PRACTITIONER

## 2021-07-21 PROCEDURE — 99213 OFFICE O/P EST LOW 20 MIN: CPT | Mod: S$GLB,,, | Performed by: NURSE PRACTITIONER

## 2021-07-23 ENCOUNTER — TELEPHONE (OUTPATIENT)
Dept: FAMILY MEDICINE | Facility: CLINIC | Age: 36
End: 2021-07-23

## 2021-07-23 DIAGNOSIS — E10.65 UNCONTROLLED TYPE 1 DIABETES MELLITUS WITH HYPERGLYCEMIA: ICD-10-CM

## 2021-07-23 DIAGNOSIS — U07.1 COVID-19 VIRUS DETECTED: ICD-10-CM

## 2021-07-23 DIAGNOSIS — U07.1 COVID-19: Primary | ICD-10-CM

## 2021-07-23 DIAGNOSIS — U07.1 COVID-19 VIRUS INFECTION: Primary | ICD-10-CM

## 2021-07-23 LAB
SARS-COV-2 RNA RESP QL NAA+PROBE: DETECTED
SARS-COV-2- CYCLE NUMBER: 10.43

## 2021-07-26 ENCOUNTER — NURSE TRIAGE (OUTPATIENT)
Dept: ADMINISTRATIVE | Facility: CLINIC | Age: 36
End: 2021-07-26

## 2021-08-11 LAB
LEFT EYE DM RETINOPATHY: NEGATIVE
RIGHT EYE DM RETINOPATHY: NEGATIVE

## 2021-09-01 ENCOUNTER — TELEPHONE (OUTPATIENT)
Dept: FAMILY MEDICINE | Facility: CLINIC | Age: 36
End: 2021-09-01

## 2021-09-01 RX ORDER — CLOTRIMAZOLE 1 %
CREAM (GRAM) TOPICAL 2 TIMES DAILY
Qty: 24 G | Refills: 0 | Status: SHIPPED | OUTPATIENT
Start: 2021-09-01 | End: 2021-10-27

## 2021-09-02 ENCOUNTER — TELEPHONE (OUTPATIENT)
Dept: FAMILY MEDICINE | Facility: CLINIC | Age: 36
End: 2021-09-02

## 2021-09-03 ENCOUNTER — OFFICE VISIT (OUTPATIENT)
Dept: FAMILY MEDICINE | Facility: CLINIC | Age: 36
End: 2021-09-03
Payer: COMMERCIAL

## 2021-09-03 VITALS
DIASTOLIC BLOOD PRESSURE: 70 MMHG | HEART RATE: 70 BPM | HEIGHT: 63 IN | BODY MASS INDEX: 25.3 KG/M2 | WEIGHT: 142.81 LBS | SYSTOLIC BLOOD PRESSURE: 128 MMHG

## 2021-09-03 DIAGNOSIS — B35.4 TINEA CORPORIS: Primary | ICD-10-CM

## 2021-09-03 PROCEDURE — 3078F PR MOST RECENT DIASTOLIC BLOOD PRESSURE < 80 MM HG: ICD-10-PCS | Mod: S$GLB,,, | Performed by: NURSE PRACTITIONER

## 2021-09-03 PROCEDURE — 1160F PR REVIEW ALL MEDS BY PRESCRIBER/CLIN PHARMACIST DOCUMENTED: ICD-10-PCS | Mod: S$GLB,,, | Performed by: NURSE PRACTITIONER

## 2021-09-03 PROCEDURE — 3008F BODY MASS INDEX DOCD: CPT | Mod: S$GLB,,, | Performed by: NURSE PRACTITIONER

## 2021-09-03 PROCEDURE — 99213 OFFICE O/P EST LOW 20 MIN: CPT | Mod: S$GLB,,, | Performed by: NURSE PRACTITIONER

## 2021-09-03 PROCEDURE — 3052F HG A1C>EQUAL 8.0%<EQUAL 9.0%: CPT | Mod: S$GLB,,, | Performed by: NURSE PRACTITIONER

## 2021-09-03 PROCEDURE — 3074F PR MOST RECENT SYSTOLIC BLOOD PRESSURE < 130 MM HG: ICD-10-PCS | Mod: S$GLB,,, | Performed by: NURSE PRACTITIONER

## 2021-09-03 PROCEDURE — 3078F DIAST BP <80 MM HG: CPT | Mod: S$GLB,,, | Performed by: NURSE PRACTITIONER

## 2021-09-03 PROCEDURE — 3052F PR MOST RECENT HEMOGLOBIN A1C LEVEL 8.0 - < 9.0%: ICD-10-PCS | Mod: S$GLB,,, | Performed by: NURSE PRACTITIONER

## 2021-09-03 PROCEDURE — 1160F RVW MEDS BY RX/DR IN RCRD: CPT | Mod: S$GLB,,, | Performed by: NURSE PRACTITIONER

## 2021-09-03 PROCEDURE — 3074F SYST BP LT 130 MM HG: CPT | Mod: S$GLB,,, | Performed by: NURSE PRACTITIONER

## 2021-09-03 PROCEDURE — 99213 PR OFFICE/OUTPT VISIT, EST, LEVL III, 20-29 MIN: ICD-10-PCS | Mod: S$GLB,,, | Performed by: NURSE PRACTITIONER

## 2021-09-03 PROCEDURE — 3008F PR BODY MASS INDEX (BMI) DOCUMENTED: ICD-10-PCS | Mod: S$GLB,,, | Performed by: NURSE PRACTITIONER

## 2021-09-03 RX ORDER — KETOCONAZOLE 20 MG/G
CREAM TOPICAL 2 TIMES DAILY
Qty: 30 G | Refills: 1 | Status: SHIPPED | OUTPATIENT
Start: 2021-09-03 | End: 2021-10-27

## 2021-09-03 RX ORDER — FLUCONAZOLE 200 MG/1
200 TABLET ORAL DAILY
Qty: 5 TABLET | Refills: 0 | Status: SHIPPED | OUTPATIENT
Start: 2021-09-03 | End: 2021-09-08

## 2021-09-07 ENCOUNTER — PATIENT OUTREACH (OUTPATIENT)
Dept: ADMINISTRATIVE | Facility: HOSPITAL | Age: 36
End: 2021-09-07

## 2021-09-15 ENCOUNTER — TELEPHONE (OUTPATIENT)
Dept: ENDOCRINOLOGY | Facility: CLINIC | Age: 36
End: 2021-09-15
Payer: COMMERCIAL

## 2021-10-20 ENCOUNTER — LAB VISIT (OUTPATIENT)
Dept: LAB | Facility: HOSPITAL | Age: 36
End: 2021-10-20
Attending: NURSE PRACTITIONER
Payer: COMMERCIAL

## 2021-10-20 DIAGNOSIS — E10.65 UNCONTROLLED TYPE 1 DIABETES MELLITUS WITH HYPERGLYCEMIA: ICD-10-CM

## 2021-10-20 DIAGNOSIS — E55.9 VITAMIN D DEFICIENCY: ICD-10-CM

## 2021-10-20 LAB
25(OH)D3+25(OH)D2 SERPL-MCNC: 33 NG/ML (ref 30–96)
ESTIMATED AVG GLUCOSE: 197 MG/DL (ref 68–131)
HBA1C MFR BLD: 8.5 % (ref 4–5.6)

## 2021-10-20 PROCEDURE — 82306 VITAMIN D 25 HYDROXY: CPT | Performed by: NURSE PRACTITIONER

## 2021-10-20 PROCEDURE — 83036 HEMOGLOBIN GLYCOSYLATED A1C: CPT | Performed by: NURSE PRACTITIONER

## 2021-10-20 PROCEDURE — 36415 COLL VENOUS BLD VENIPUNCTURE: CPT | Mod: PO | Performed by: NURSE PRACTITIONER

## 2021-10-27 ENCOUNTER — OFFICE VISIT (OUTPATIENT)
Dept: ENDOCRINOLOGY | Facility: CLINIC | Age: 36
End: 2021-10-27
Payer: COMMERCIAL

## 2021-10-27 VITALS
HEIGHT: 63 IN | OXYGEN SATURATION: 98 % | TEMPERATURE: 98 F | DIASTOLIC BLOOD PRESSURE: 70 MMHG | SYSTOLIC BLOOD PRESSURE: 108 MMHG | BODY MASS INDEX: 23.86 KG/M2 | HEART RATE: 78 BPM | WEIGHT: 134.69 LBS

## 2021-10-27 DIAGNOSIS — E55.9 VITAMIN D DEFICIENCY: ICD-10-CM

## 2021-10-27 DIAGNOSIS — E10.65 UNCONTROLLED TYPE 1 DIABETES MELLITUS WITH HYPERGLYCEMIA: Primary | ICD-10-CM

## 2021-10-27 PROCEDURE — 3074F PR MOST RECENT SYSTOLIC BLOOD PRESSURE < 130 MM HG: ICD-10-PCS | Mod: CPTII,S$GLB,, | Performed by: NURSE PRACTITIONER

## 2021-10-27 PROCEDURE — 99999 PR PBB SHADOW E&M-EST. PATIENT-LVL III: CPT | Mod: PBBFAC,,, | Performed by: NURSE PRACTITIONER

## 2021-10-27 PROCEDURE — 3066F PR DOCUMENTATION OF TREATMENT FOR NEPHROPATHY: ICD-10-PCS | Mod: CPTII,S$GLB,, | Performed by: NURSE PRACTITIONER

## 2021-10-27 PROCEDURE — 3008F PR BODY MASS INDEX (BMI) DOCUMENTED: ICD-10-PCS | Mod: CPTII,S$GLB,, | Performed by: NURSE PRACTITIONER

## 2021-10-27 PROCEDURE — 99214 PR OFFICE/OUTPT VISIT, EST, LEVL IV, 30-39 MIN: ICD-10-PCS | Mod: S$GLB,,, | Performed by: NURSE PRACTITIONER

## 2021-10-27 PROCEDURE — 3078F PR MOST RECENT DIASTOLIC BLOOD PRESSURE < 80 MM HG: ICD-10-PCS | Mod: CPTII,S$GLB,, | Performed by: NURSE PRACTITIONER

## 2021-10-27 PROCEDURE — 1159F PR MEDICATION LIST DOCUMENTED IN MEDICAL RECORD: ICD-10-PCS | Mod: CPTII,S$GLB,, | Performed by: NURSE PRACTITIONER

## 2021-10-27 PROCEDURE — 3052F HG A1C>EQUAL 8.0%<EQUAL 9.0%: CPT | Mod: CPTII,S$GLB,, | Performed by: NURSE PRACTITIONER

## 2021-10-27 PROCEDURE — 99214 OFFICE O/P EST MOD 30 MIN: CPT | Mod: S$GLB,,, | Performed by: NURSE PRACTITIONER

## 2021-10-27 PROCEDURE — 3061F NEG MICROALBUMINURIA REV: CPT | Mod: CPTII,S$GLB,, | Performed by: NURSE PRACTITIONER

## 2021-10-27 PROCEDURE — 3061F PR NEG MICROALBUMINURIA RESULT DOCUMENTED/REVIEW: ICD-10-PCS | Mod: CPTII,S$GLB,, | Performed by: NURSE PRACTITIONER

## 2021-10-27 PROCEDURE — 3066F NEPHROPATHY DOC TX: CPT | Mod: CPTII,S$GLB,, | Performed by: NURSE PRACTITIONER

## 2021-10-27 PROCEDURE — 3008F BODY MASS INDEX DOCD: CPT | Mod: CPTII,S$GLB,, | Performed by: NURSE PRACTITIONER

## 2021-10-27 PROCEDURE — 3074F SYST BP LT 130 MM HG: CPT | Mod: CPTII,S$GLB,, | Performed by: NURSE PRACTITIONER

## 2021-10-27 PROCEDURE — 3052F PR MOST RECENT HEMOGLOBIN A1C LEVEL 8.0 - < 9.0%: ICD-10-PCS | Mod: CPTII,S$GLB,, | Performed by: NURSE PRACTITIONER

## 2021-10-27 PROCEDURE — 99999 PR PBB SHADOW E&M-EST. PATIENT-LVL III: ICD-10-PCS | Mod: PBBFAC,,, | Performed by: NURSE PRACTITIONER

## 2021-10-27 PROCEDURE — 3078F DIAST BP <80 MM HG: CPT | Mod: CPTII,S$GLB,, | Performed by: NURSE PRACTITIONER

## 2021-10-27 PROCEDURE — 1159F MED LIST DOCD IN RCRD: CPT | Mod: CPTII,S$GLB,, | Performed by: NURSE PRACTITIONER

## 2021-10-27 RX ORDER — BLOOD-GLUCOSE SENSOR
EACH MISCELLANEOUS
Qty: 3 EACH | Refills: 12 | Status: SHIPPED | OUTPATIENT
Start: 2021-10-27 | End: 2022-11-30 | Stop reason: SDUPTHER

## 2021-10-27 RX ORDER — BLOOD-GLUCOSE TRANSMITTER
EACH MISCELLANEOUS
Qty: 1 EACH | Refills: 3 | Status: SHIPPED | OUTPATIENT
Start: 2021-10-27 | End: 2023-02-01 | Stop reason: SDUPTHER

## 2022-01-05 ENCOUNTER — TELEPHONE (OUTPATIENT)
Dept: FAMILY MEDICINE | Facility: CLINIC | Age: 37
End: 2022-01-05
Payer: COMMERCIAL

## 2022-01-05 NOTE — TELEPHONE ENCOUNTER
Spoke with pt, she is going to call her endocrinologist regarding her bs and will go to urgent care for sore throat/strep testing.

## 2022-01-05 NOTE — TELEPHONE ENCOUNTER
----- Message from Wanda Buckley sent at 1/5/2022  8:39 AM CST -----  Pt calling said she test positive for covid 11 days ago but is now having sore throat and diarrhea, said she is taking otc imodium with minimal improvement and she is diabetic said this is causing her BS to drop said it was 45 this morning. She wants to be seen today if possible.  # [phone]

## 2022-01-20 ENCOUNTER — LAB VISIT (OUTPATIENT)
Dept: LAB | Facility: HOSPITAL | Age: 37
End: 2022-01-20
Attending: NURSE PRACTITIONER
Payer: COMMERCIAL

## 2022-01-20 DIAGNOSIS — E10.65 UNCONTROLLED TYPE 1 DIABETES MELLITUS WITH HYPERGLYCEMIA: ICD-10-CM

## 2022-01-20 LAB
ALBUMIN SERPL BCP-MCNC: 3.1 G/DL (ref 3.5–5.2)
ALP SERPL-CCNC: 56 U/L (ref 55–135)
ALT SERPL W/O P-5'-P-CCNC: 16 U/L (ref 10–44)
ANION GAP SERPL CALC-SCNC: 8 MMOL/L (ref 8–16)
AST SERPL-CCNC: 12 U/L (ref 10–40)
BILIRUB SERPL-MCNC: 0.6 MG/DL (ref 0.1–1)
BUN SERPL-MCNC: 9 MG/DL (ref 6–20)
CALCIUM SERPL-MCNC: 9 MG/DL (ref 8.7–10.5)
CHLORIDE SERPL-SCNC: 102 MMOL/L (ref 95–110)
CHOLEST SERPL-MCNC: 167 MG/DL (ref 120–199)
CHOLEST/HDLC SERPL: 2.3 {RATIO} (ref 2–5)
CO2 SERPL-SCNC: 22 MMOL/L (ref 23–29)
CREAT SERPL-MCNC: 0.7 MG/DL (ref 0.5–1.4)
EST. GFR  (AFRICAN AMERICAN): >60 ML/MIN/1.73 M^2
EST. GFR  (NON AFRICAN AMERICAN): >60 ML/MIN/1.73 M^2
ESTIMATED AVG GLUCOSE: 217 MG/DL (ref 68–131)
GLUCOSE SERPL-MCNC: 241 MG/DL (ref 70–110)
HBA1C MFR BLD: 9.2 % (ref 4–5.6)
HDLC SERPL-MCNC: 72 MG/DL (ref 40–75)
HDLC SERPL: 43.1 % (ref 20–50)
LDLC SERPL CALC-MCNC: 74 MG/DL (ref 63–159)
NONHDLC SERPL-MCNC: 95 MG/DL
POTASSIUM SERPL-SCNC: 4.3 MMOL/L (ref 3.5–5.1)
PROT SERPL-MCNC: 6.6 G/DL (ref 6–8.4)
SODIUM SERPL-SCNC: 132 MMOL/L (ref 136–145)
TRIGL SERPL-MCNC: 105 MG/DL (ref 30–150)

## 2022-01-20 PROCEDURE — 83036 HEMOGLOBIN GLYCOSYLATED A1C: CPT | Performed by: NURSE PRACTITIONER

## 2022-01-20 PROCEDURE — 80053 COMPREHEN METABOLIC PANEL: CPT | Performed by: NURSE PRACTITIONER

## 2022-01-20 PROCEDURE — 36415 COLL VENOUS BLD VENIPUNCTURE: CPT | Mod: PO | Performed by: NURSE PRACTITIONER

## 2022-01-20 PROCEDURE — 80061 LIPID PANEL: CPT | Performed by: NURSE PRACTITIONER

## 2022-03-30 RX ORDER — BLOOD-GLUCOSE,RECEIVER,CONT
EACH MISCELLANEOUS
Qty: 1 EACH | Refills: 0 | Status: CANCELLED | OUTPATIENT
Start: 2022-03-30

## 2022-03-31 ENCOUNTER — LAB VISIT (OUTPATIENT)
Dept: LAB | Facility: HOSPITAL | Age: 37
End: 2022-03-31
Attending: NURSE PRACTITIONER
Payer: COMMERCIAL

## 2022-03-31 ENCOUNTER — OFFICE VISIT (OUTPATIENT)
Dept: ENDOCRINOLOGY | Facility: CLINIC | Age: 37
End: 2022-03-31
Payer: COMMERCIAL

## 2022-03-31 VITALS
OXYGEN SATURATION: 98 % | TEMPERATURE: 98 F | BODY MASS INDEX: 24.32 KG/M2 | HEART RATE: 71 BPM | SYSTOLIC BLOOD PRESSURE: 112 MMHG | WEIGHT: 137.25 LBS | HEIGHT: 63 IN | DIASTOLIC BLOOD PRESSURE: 74 MMHG

## 2022-03-31 DIAGNOSIS — E10.65 UNCONTROLLED TYPE 1 DIABETES MELLITUS WITH HYPERGLYCEMIA: Primary | ICD-10-CM

## 2022-03-31 DIAGNOSIS — Z82.49 FAMILY HISTORY OF HEART DISEASE: ICD-10-CM

## 2022-03-31 DIAGNOSIS — E55.9 VITAMIN D DEFICIENCY: ICD-10-CM

## 2022-03-31 DIAGNOSIS — E10.65 UNCONTROLLED TYPE 1 DIABETES MELLITUS WITH HYPERGLYCEMIA: ICD-10-CM

## 2022-03-31 LAB
ESTIMATED AVG GLUCOSE: 209 MG/DL (ref 68–131)
HBA1C MFR BLD: 8.9 % (ref 4–5.6)

## 2022-03-31 PROCEDURE — 99999 PR PBB SHADOW E&M-EST. PATIENT-LVL IV: ICD-10-PCS | Mod: PBBFAC,,, | Performed by: NURSE PRACTITIONER

## 2022-03-31 PROCEDURE — 1159F PR MEDICATION LIST DOCUMENTED IN MEDICAL RECORD: ICD-10-PCS | Mod: CPTII,S$GLB,, | Performed by: NURSE PRACTITIONER

## 2022-03-31 PROCEDURE — 3008F BODY MASS INDEX DOCD: CPT | Mod: CPTII,S$GLB,, | Performed by: NURSE PRACTITIONER

## 2022-03-31 PROCEDURE — 99999 PR PBB SHADOW E&M-EST. PATIENT-LVL IV: CPT | Mod: PBBFAC,,, | Performed by: NURSE PRACTITIONER

## 2022-03-31 PROCEDURE — 83036 HEMOGLOBIN GLYCOSYLATED A1C: CPT | Performed by: NURSE PRACTITIONER

## 2022-03-31 PROCEDURE — 3074F PR MOST RECENT SYSTOLIC BLOOD PRESSURE < 130 MM HG: ICD-10-PCS | Mod: CPTII,S$GLB,, | Performed by: NURSE PRACTITIONER

## 2022-03-31 PROCEDURE — 3046F PR MOST RECENT HEMOGLOBIN A1C LEVEL > 9.0%: ICD-10-PCS | Mod: CPTII,S$GLB,, | Performed by: NURSE PRACTITIONER

## 2022-03-31 PROCEDURE — 3078F PR MOST RECENT DIASTOLIC BLOOD PRESSURE < 80 MM HG: ICD-10-PCS | Mod: CPTII,S$GLB,, | Performed by: NURSE PRACTITIONER

## 2022-03-31 PROCEDURE — 99214 PR OFFICE/OUTPT VISIT, EST, LEVL IV, 30-39 MIN: ICD-10-PCS | Mod: S$GLB,,, | Performed by: NURSE PRACTITIONER

## 2022-03-31 PROCEDURE — 99214 OFFICE O/P EST MOD 30 MIN: CPT | Mod: S$GLB,,, | Performed by: NURSE PRACTITIONER

## 2022-03-31 PROCEDURE — 3046F HEMOGLOBIN A1C LEVEL >9.0%: CPT | Mod: CPTII,S$GLB,, | Performed by: NURSE PRACTITIONER

## 2022-03-31 PROCEDURE — 36415 COLL VENOUS BLD VENIPUNCTURE: CPT | Mod: PO | Performed by: NURSE PRACTITIONER

## 2022-03-31 PROCEDURE — 3074F SYST BP LT 130 MM HG: CPT | Mod: CPTII,S$GLB,, | Performed by: NURSE PRACTITIONER

## 2022-03-31 PROCEDURE — 3008F PR BODY MASS INDEX (BMI) DOCUMENTED: ICD-10-PCS | Mod: CPTII,S$GLB,, | Performed by: NURSE PRACTITIONER

## 2022-03-31 PROCEDURE — 1159F MED LIST DOCD IN RCRD: CPT | Mod: CPTII,S$GLB,, | Performed by: NURSE PRACTITIONER

## 2022-03-31 PROCEDURE — 3078F DIAST BP <80 MM HG: CPT | Mod: CPTII,S$GLB,, | Performed by: NURSE PRACTITIONER

## 2022-03-31 RX ORDER — PEN NEEDLE, DIABETIC 31 GX5/16"
NEEDLE, DISPOSABLE MISCELLANEOUS
Qty: 400 EACH | Refills: 4 | Status: SHIPPED | OUTPATIENT
Start: 2022-03-31

## 2022-03-31 RX ORDER — INSULIN GLARGINE 300 U/ML
INJECTION, SOLUTION SUBCUTANEOUS
Qty: 3 PEN | Refills: 12
Start: 2022-03-31 | End: 2023-01-30

## 2022-03-31 NOTE — PROGRESS NOTES
CC: This 36 y.o. female presents for management of diabetes, depression, vitamin d deficiency     HPI: She was diagnosed with T1DM at age 8 years. Hospitalized r/t DM at diagnosis  Family hx of DM: no one   Has two children 17 y/o daughter, 13 y/o son  - tubal ligation 2008 and ablation 2014  Former patient of Dr Ramos    Since last visit-  +Covid in Decemeber   since last visit official  Just started Dexcom back yesterday - bg are everywhere, does get hypos in the middle of the night, snacks and then has a rebound high    Diet: Eats 2-3  Meals a day, snacks- rarely   May skip breakfast, protein shake coffee only  Exercise: 3 days a week at the gym for 60 mins- weights and cardio  CURRENT DM MEDS: Toujeo max 36u qam, Humalog carb ratio 1:10, ISF 50, Target 130   Timing prandial insulin 5-15 minutes before meals: yes  Vial/pen:  Uses pen  Glucometer type:  One Touch Ultra Mini 2 yrs     Standards of Care:  Eye exam: 8/2021, follows w Dr Mast     Works at a bank in [employer]     Mom with hypothyroidism  Mom also with MI at age 37 years    ROS:   Gen: Appetite good, + weight gain 3lbs  Eyes: Denies visual disturbances  Resp: no SOB   no POWELL, no cough  Cardiac: no palpitations , no chest pain, no edema   GI: No nausea or vomiting, diarrhea, constipation, or abdominal pain.  /GYN: 3+ nocturia, no burning or pain.   MS/Neuro: Denies numbness/ tingling in BLE; Gait steady, speech clear  Psych: Denies drug/ETOH abuse, + h/o depression-   Other systems: negative.    PE:  GENERAL: Well developed, well nourished.  PSYCH: AAOx3, appropriate mood and affect, pleasant expression, conversant, appears relaxed, well groomed.   EYES: Conjunctiva, corneas clear  NECK: Supple, trachea midline  NEURO: Gait steady  SKIN:   no acanthosis nigracans.  FOOT EXAMINATION: 10/27/2021  No foot deformity, corns or callus formation,  nails in good condition and well trimmed, no interspace maceration or ulceration noted     Protective sensation intact with 10 gram monofilament.  +2 dorsalis pedis and posterior pulses noted.    Lab Results   Component Value Date    MICALBCREAT 4.6 10/20/2021       Hemoglobin A1C   Date Value Ref Range Status   01/20/2022 9.2 (H) 4.0 - 5.6 % Final     Comment:     ADA Screening Guidelines:  5.7-6.4%  Consistent with prediabetes  >or=6.5%  Consistent with diabetes    High levels of fetal hemoglobin interfere with the HbA1C  assay. Heterozygous hemoglobin variants (HbS, HgC, etc)do  not significantly interfere with this assay.   However, presence of multiple variants may affect accuracy.     10/20/2021 8.5 (H) 4.0 - 5.6 % Final     Comment:     ADA Screening Guidelines:  5.7-6.4%  Consistent with prediabetes  >or=6.5%  Consistent with diabetes    High levels of fetal hemoglobin interfere with the HbA1C  assay. Heterozygous hemoglobin variants (HbS, HgC, etc)do  not significantly interfere with this assay.   However, presence of multiple variants may affect accuracy.     02/05/2021 8.4 (H) 4.0 - 5.6 % Final     Comment:     ADA Screening Guidelines:  5.7-6.4%  Consistent with prediabetes  >or=6.5%  Consistent with diabetes    High levels of fetal hemoglobin interfere with the HbA1C  assay. Heterozygous hemoglobin variants (HbS, HgC, etc)do  not significantly interfere with this assay.   However, presence of multiple variants may affect accuracy.     08/16/2019 8.7 % Final        ASSESSMENT and PLAN:    1. T1DM with hyperglycemia-   Lab today  Start InPen- setting set in clinic as below:  Carb ratio 10, ISF 50, Target 120  Active Ins 3 hrs, Decrease Toujeo to 34 u qhs  Message me in 2 weeks to review Dexcom G6  Baqsimi @ home    2. Depression- stable , chronic managed by PCP    3. Vitamin d deficiency- Contiue Vitamin D3 1000 IU daily, lab today    4. [facility] cardiac dz- mom w MI at 37 yrs      Follow-up:   in 3 months with lab prior

## 2022-04-14 ENCOUNTER — NUTRITION (OUTPATIENT)
Dept: DIABETES | Facility: CLINIC | Age: 37
End: 2022-04-14
Payer: COMMERCIAL

## 2022-04-14 DIAGNOSIS — E10.65 UNCONTROLLED TYPE 1 DIABETES MELLITUS WITH HYPERGLYCEMIA: ICD-10-CM

## 2022-04-14 PROCEDURE — 99999 PR PBB SHADOW E&M-EST. PATIENT-LVL II: CPT | Mod: PBBFAC,,, | Performed by: DIETITIAN, REGISTERED

## 2022-04-14 PROCEDURE — G0108 DIAB MANAGE TRN  PER INDIV: HCPCS | Mod: S$GLB,,, | Performed by: DIETITIAN, REGISTERED

## 2022-04-14 PROCEDURE — G0108 PR DIAB MANAGE TRN  PER INDIV: ICD-10-PCS | Mod: S$GLB,,, | Performed by: DIETITIAN, REGISTERED

## 2022-04-14 PROCEDURE — 99999 PR PBB SHADOW E&M-EST. PATIENT-LVL II: ICD-10-PCS | Mod: PBBFAC,,, | Performed by: DIETITIAN, REGISTERED

## 2022-04-19 VITALS — WEIGHT: 137.13 LBS | HEIGHT: 63 IN | BODY MASS INDEX: 24.3 KG/M2

## 2022-04-19 NOTE — PROGRESS NOTES
Diabetes Care Specialist Progress Note  Author: Susan Rider RD, CDE  Date: 4/19/2022    Program Intake  Reason for Diabetes Program Visit:: Intervention  Type of Intervention:: Individual  Individual: Device Training  Device Training: Other (Inpen)  Current diabetes risk level:: moderate  In the last 12 months, have you:: none  Permission to speak with others about care:: no    Lab Results   Component Value Date    HGBA1C 8.9 (H) 03/31/2022       Clinical    Patient Health Rating  Compared to other people your age, how would you rate your health?: Good    Problem Review  Reviewed Problem List with Patient: yes  Active comorbidities affecting diabetes self-care.: no  Reviewed health maintenance: yes    Clinical Assessment  Current Diabetes Treatment: Insulin  Have you ever experienced hypoglycemia (low blood sugar)?: yes  In the last month, how often have you experienced low blood sugar?: once a week  Are you able to tell when your blood sugar is low?: Yes  What symptoms do you experience?: Shaky, Sweaty, Tiredness, Dizzy/Light-headed  Have you ever been hospitalized because your blood sugar was too low?: no  How do you treat hypoglycemia (low blood sugar)?: 1/2 can soda/fruit juice, 4 glucose tablets  Have you ever experienced hyperglycemia (high blood sugar)?: yes  In the last month, how often have you experienced high blood sugar?: once a week  Are you able to tell when your blood sugar is high?: Yes  What are your symptoms?: thirst  Have you ever been hospitalized because your blood sugar was high?: no    Medication Information  How do you obtain your medications?: Patient drives  How many days a week do you miss your medications?: Never  Do you use a pill box or medication chart to help you manage your medications?: No  Do you sometimes have difficulty refilling your medications?: No  Medication adherence impacting ability to self-manage diabetes?: No    Labs  Do you have regular lab work to monitor your  medications?: Yes  Type of Regular Lab Work: A1c, Cholesterol, Microalbumin  [facility]  Lab Compliance Barriers: No    Nutritional Status  Diet: Diabetic diet  Change in appetite?: No  Dentation:: Intact  Recent Changes in Weight: No Recent Weight Change  Current nutritional status an area of need that is impacting patient's ability to self-manage diabetes?: No    Additional Social History    Support  Does anyone support you with your diabetes care?: no  Who takes you to your medical appointments?: self  Does the current support meet the patient's needs?: Yes  Is Support an area impacting ability to self-manage diabetes?: No    Access to Mass Media & Technology  Does the patient have access to any of the following devices or technologies?: Smart phone, Home computer, Internet Access  Media or technology needs impacting ability to self-manage diabetes?: No    Cognitive/Behavioral Health  Alert and Oriented: Yes  Difficulty Thinking: No  Requires Prompting: No  Requires assistance for routine expression?: No  Cognitive or behavioral barriers impacting ability to self-manage diabetes?: No    Culture/Pentecostalism  Culture or Episcopalian beliefs that may impact ability to access healthcare: No    Communication  Language preference: English  Hearing Problems: No  Vision Problems: No  Communication needs impacting ability to self-manage diabetes?: No    Health Literacy  Preferred Learning Method: Face to Face, Hands On, Web Based, Reading Materials, Demonstration  How often do you need to have someone help you read instructions, pamphlets, or written material from your doctor or pharmacy?: Never  Health literacy needs impacting ability to self-manage diabetes?: No      Diabetes Self-Management Skills Assessment    Diabetes Disease Process/Treatment Options  Patient/caregiver able to state what happens when someone has diabetes.: yes  Patient/caregiver knows what type of diabetes they have.:  yes  Diabetes Type : Type I  Patient/caregiver able to identify at least three signs and symptoms of diabetes.: yes  Identified signs and symptoms:: frequent urination, increased thirst, frequent infections, fatigue  Patient able to identify at least three risk factors for diabetes.: yes  Identified risk factors:: being overweight, age over 40, ethnicity, family history  Diabetes Disease Process/Treatment Options: Skills Assessment Completed: Yes  Assessment indicates:: Adequate understanding  Area of need?: No    Nutrition/Healthy Eating  Method of carbohydrate measurement:: carb counting/reading labels  Patient can identify foods that impact blood sugar.: yes  Patient-identified foods:: starches (bread, pasta, rice, cereal), sweets, yogurt, starchy vegetables (corn, peas, beans), fruit/fruit juice, milk  Nutrition/Healthy Eating Skills Assessment Completed:: Yes  Assessment indicates:: Adequate understanding  Area of need?: No    Physical Activity/Exercise  Patient's daily activity level:: lightly active  Patient formally exercises outside of work.: yes  Exercise Type: exercise class  Intensity: Moderate  Frequency: three times a week  Duration: 30 min  Patient can identify forms of physical activity.: yes  Stated forms of physical activity:: any movement performed by muscles that uses energy  Patient can identify reasons why exercise/physical activity is important in diabetes management.: yes  Identified reasons:: helps insulin work better, lowers blood glucose, blood pressure, and cholesterol, lowers risk of heart disease and stroke  Physical Activity/Exercise Skills Assessment Completed: : Yes  Assessment indicates:: Adequate understanding  Area of need?: No    Medications  Patient is able to describe current diabetes management routine.: yes  Diabetes management routine:: insulin  Patient is able to identify current diabetes medications, dosages, and appropriate timing of medications.: yes  Patient understands  the purpose of the medications taken for diabetes.: yes  Patient reports problems or concerns with current medication regimen.: no  Medication Skills Assessment Completed:: Yes  Assessment indicates:: Adequate understanding  Area of need?: No    Home Blood Glucose Monitoring  Patient states that blood sugar is checked at home daily.: yes  Monitoring Method:: personal continuous glucose monitor  Personal CGM type:: Dexcom G6  Patient is able to use personal CGM appropriately.: yes  CGM Report reviewed?: yes  Home Blood Glucose Monitoring Skills Assessment Completed: : Yes  Assessment indicates:: Adequate understanding  Area of need?: No    Acute Complications  Patient is able to identify types of acute complications: Yes  Patient Identified:: Hypoglycemia, Hyperglycemia, Diabetic Ketoacidosis  Patient is able to state the basic meaning of hypoglycemia?: Yes  Able to state the blood sugar range for hypoglycemia?: yes  Patient can identify general symptoms of hypoglycemia: yes  Patient identified:: fatigue, irritability, sweating, shakiness  Able to state proper treatment of hypoglycemia?: yes  Patient identified:: 1/2 can soda/fruit juice, 4 glucose tablets, 5-6 pieces of hard candy  Patient is able to state the basic meaning of hyperglycemia?: Yes  Able to state the blood sugar range for hyperglycemia?: yes  Patient able to state proper treatment of hyperglycemia?: yes  Patient identified:: monitor blood sugar, take medication as recommended  Patient able to verbalize sick day plan?: yes  Patient-stated sick day plan:: call provider if blood sugar does not improve, check blood sugar every 2-3 hours, check urine for ketones, drink more fluids  Acute Complications Skills Assessment Completed: : Yes  Assessment indicates:: Adequate understanding  Area of need?: No    Chronic Complications  Patient can identify major chronic complications of diabetes.: yes  Stated chronic complications:: heart disease/heart attack,  kidney disease, neuropathy/nerve damage, retinopathy, sexual dysfunction, stroke  Patient can identify ways to prevent or delay diabetes complications.: yes  Stated ways to prevent complications:: controlling blood sugar, controlling cholesterol and triglycerides, healthy eating and regular activity, maintaining optimal blood glucose control  Patient is aware that having diabetes increases risk of heart disease?: Yes  Patient is aware that heart disease is the leading cause of death and disability in people with diabetes?: Yes  Patient able to state risk factors for heart disease?: Yes  Patient stated risk factors for heart disease:: High blood pressure, High cholesterol, Diet, Limited activity, Medication non-adherance, Having diabetes  Patient is taking statin?: No  Has your doctor talked to you about Statin use?: No  Do you want more information on Statins?: No  Chronic Complications Skills Assessment Completed: : Yes  Assessment indicates:: Adequate understanding  Area of need?: No    Psychosocial/Coping  Patient can identify ways of coping with chronic disease.: yes  Patient-stated ways of coping with chronic disease:: support from loved ones  Psychosocial/Coping Skills Assessment Completed: : Yes  Assessment indicates:: Adequate understanding  Area of need?: No      Diabetes Self Support Plan         Assessment Summary and Plan    Based on today's diabetes care assessment, the following areas of need were identified:      Social 4/14/2022   Support No   Access to Mass Media/Tech No   Cognitive/Behavioral Health No   Culture/[employer]ist No   Communication No   Health Literacy No        Clinical 4/14/2022   Medication Adherence No   Lab Compliance No   Nutritional Status No        Diabetes Self-Management Skills 4/14/2022   Diabetes Disease Process/Treatment Options No   Nutrition/Healthy Eating No   Physical Activity/Exercise No   Medication Yes See Care Plan   Home Blood Glucose Monitoring No   Acute  Complications No   Chronic Complications No   Psychosocial/Coping No          Today's interventions were provided through individual discussion, instruction, and written materials were provided.      Patient verbalized understanding of instruction and written materials.  Pt was able to return back demonstration of instructions today. Patient understood key points, needs reinforcement and further instruction.     Diabetes Self-Management Care Plan:    Today's Diabetes Self-Management Care Plan was developed with Marah's input. Marah has agreed to work toward the following goal(s) to improve his/her overall diabetes control.      Care Plan: Diabetes Management   Updates made since 3/20/2022 12:00 AM      Problem: Blood Glucose Self-Monitoring       Goal: Patient agrees to check and record blood sugars continuously with Dexcom. Completed 3/31/2021   Start Date: 3/24/2021   Note:    Will follow up one week to review Dexcom report. Instructed patient to record insulin and carbs into elva on phone.     Task: Provided patient with a meter today and sent Rx request to provider to send to patients pharmacy. Completed 4/19/2022      Task: Reviewed the importance of self-monitoring blood glucose and keeping logs. Completed 4/19/2022      Task: Instructed on how to self-monitor blood glucose using a home glucometer, how to properly dispose of used strips and lancets after use, and how to appropriately store meter and supplies. Completed 4/19/2022      Task: Discussed ways to minimize pain when monitoring blood glucose. Completed 4/19/2022      Problem: Medications       Goal: Patient here today yto start Inpen    Start Date: 4/14/2022   Expected End Date: 7/14/2022   Priority: High   Note:    Patient here to start Inpen. Settings as follows Target   CHO ratio 10 and ISF 50 IOB 3 hours    She did not have insulin cartridges for Inpen but is planning to get in am           Task: Instructed patient on how to self-administer  Inpen Completed 4/19/2022      Task: Discussed guidelines for preventing, detecting and treating hypoglycemia and hyperglycemia and reviewed the importance of meal and medication timing with diabetes mediations for prevention of hypoglycemia and maximum drug benefit. Completed 4/19/2022          Follow Up Plan     Today's care plan and follow up schedule was discussed with patient.  Marah verbalized understanding of the care plan, goals, and agrees to follow up plan.        The patient was encouraged to communicate with his/her health care provider/physician and care team regarding his/her condition(s) and treatment.  I provided the patient with my contact information today and encouraged to contact me via phone or [facility] Patient Portal as needed.     Length of Visit   Total Time: 60 Minutes

## 2022-04-19 NOTE — PATIENT INSTRUCTIONS
Will follow up with Elaine Hernadez NP as scheduled and educated her on how to send report from Inpen via elva

## 2022-05-04 ENCOUNTER — PATIENT MESSAGE (OUTPATIENT)
Dept: ENDOCRINOLOGY | Facility: CLINIC | Age: 37
End: 2022-05-04
Payer: COMMERCIAL

## 2022-06-02 ENCOUNTER — PATIENT MESSAGE (OUTPATIENT)
Dept: DIABETES | Facility: CLINIC | Age: 37
End: 2022-06-02
Payer: COMMERCIAL

## 2022-06-14 ENCOUNTER — PATIENT MESSAGE (OUTPATIENT)
Dept: ENDOCRINOLOGY | Facility: CLINIC | Age: 37
End: 2022-06-14
Payer: COMMERCIAL

## 2022-09-07 LAB
LEFT EYE DM RETINOPATHY: NEGATIVE
RIGHT EYE DM RETINOPATHY: NEGATIVE

## 2022-09-13 ENCOUNTER — PATIENT OUTREACH (OUTPATIENT)
Dept: ADMINISTRATIVE | Facility: HOSPITAL | Age: 37
End: 2022-09-13
Payer: COMMERCIAL

## 2022-09-23 ENCOUNTER — PATIENT MESSAGE (OUTPATIENT)
Dept: ENDOCRINOLOGY | Facility: CLINIC | Age: 37
End: 2022-09-23
Payer: COMMERCIAL

## 2022-10-17 ENCOUNTER — PATIENT MESSAGE (OUTPATIENT)
Dept: ENDOCRINOLOGY | Facility: CLINIC | Age: 37
End: 2022-10-17
Payer: COMMERCIAL

## 2022-11-30 DIAGNOSIS — E10.65 UNCONTROLLED TYPE 1 DIABETES MELLITUS WITH HYPERGLYCEMIA: ICD-10-CM

## 2022-11-30 RX ORDER — BLOOD-GLUCOSE SENSOR
EACH MISCELLANEOUS
Qty: 3 EACH | Refills: 12 | Status: SHIPPED | OUTPATIENT
Start: 2022-11-30 | End: 2023-12-26 | Stop reason: SDUPTHER

## 2022-12-04 ENCOUNTER — PATIENT MESSAGE (OUTPATIENT)
Dept: ENDOCRINOLOGY | Facility: CLINIC | Age: 37
End: 2022-12-04
Payer: COMMERCIAL

## 2023-01-24 ENCOUNTER — LAB VISIT (OUTPATIENT)
Dept: LAB | Facility: HOSPITAL | Age: 38
End: 2023-01-24
Attending: NURSE PRACTITIONER
Payer: COMMERCIAL

## 2023-01-24 DIAGNOSIS — E10.65 UNCONTROLLED TYPE 1 DIABETES MELLITUS WITH HYPERGLYCEMIA: ICD-10-CM

## 2023-01-24 LAB
ESTIMATED AVG GLUCOSE: 189 MG/DL (ref 68–131)
HBA1C MFR BLD: 8.2 % (ref 4–5.6)

## 2023-01-24 PROCEDURE — 83036 HEMOGLOBIN GLYCOSYLATED A1C: CPT | Performed by: NURSE PRACTITIONER

## 2023-01-24 PROCEDURE — 36415 COLL VENOUS BLD VENIPUNCTURE: CPT | Mod: PO | Performed by: NURSE PRACTITIONER

## 2023-01-29 NOTE — PROGRESS NOTES
The patient location is: home  The chief complaint leading to consultation is: diabetes    Visit type: audiovisual    Face to Face time with patient: 18 mins  22 minutes of total time spent on the encounter, which includes face to face time and non-face to face time preparing to see the patient (eg, review of tests), Obtaining and/or reviewing separately obtained history, Documenting clinical information in the electronic or other health record, Independently interpreting results (not separately reported) and communicating results to the patient/family/caregiver, or Care coordination (not separately reported).     each patient to whom he or she provides medical services by telemedicine is:  (1) informed of the relationship between the physician and patient and the respective role of any other health care provider with respect to management of the patient; and (2) notified that he or she may decline to receive medical services by telemedicine and may withdraw from such care at any time.       CC: This 37 y.o. female presents for management of diabetes, depression, vitamin d deficiency     HPI: She was diagnosed with T1DM at age 8 years. Hospitalized r/t DM at diagnosis  Family hx of DM: no one   Has two children 17 y/o daughter, 13 y/o son  - tubal ligation 2008 and ablation 2014  - since last visit official     Last seen 3/2022  Wearing Dexcom G6- see download in media tab  Time in range: 46%  Hypoglycemia <1%  Pp excursions noted after supper, bg do drop between 3-5am  Has been holding ins at supper bc of this  Diet: Eats 2-3  Meals a day, snacks- rarely   May skip breakfast, protein shake coffee only  Exercise:  trying to get to gym, has a second job now in retail  CURRENT DM MEDS: Toujeo max 34u qam, Humalog carb ratio 1:10, ISF 50, Target 120 - via Inpen  Timing prandial insulin 5-15 minutes before meals: yes  Vial/pen:  Uses pen  Glucometer type:  One Touch Ultra Mini       Standards of Care:  Eye  exam: 10/2022 follows w Dr Mast     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  /GYN: 3+ nocturia, no burning or pain.   MS/Neuro: Denies numbness/ tingling in BLE; Gait steady, speech clear  Psych: Denies drug/ETOH abuse, + h/o depression-   Other systems: negative.    PE:  GENERAL: Well developed, well nourished.  PSYCH: AAOx3, appropriate mood and affect, pleasant expression, conversant, appears relaxed, well groomed.   EYES: Conjunctiva, corneas clear  NECK: Supple, trachea midline  NEURO: Gait steady  SKIN:   no acanthosis nigracans.  FOOT EXAMINATION: 10/27/2021  No foot deformity, corns or callus formation,  nails in good condition and well trimmed, no interspace maceration or ulceration noted    Protective sensation intact with 10 gram monofilament.  +2 dorsalis pedis and posterior pulses noted.    Lab Results   Component Value Date    MICALBCREAT Unable to calculate 03/31/2022       Hemoglobin A1C   Date Value Ref Range Status   01/24/2023 8.2 (H) 4.0 - 5.6 % Final     Comment:     ADA Screening Guidelines:  5.7-6.4%  Consistent with prediabetes  >or=6.5%  Consistent with diabetes    High levels of fetal hemoglobin interfere with the HbA1C  assay. Heterozygous hemoglobin variants (HbS, HgC, etc)do  not significantly interfere with this assay.   However, presence of multiple variants may affect accuracy.     03/31/2022 8.9 (H) 4.0 - 5.6 % Final     Comment:     ADA Screening Guidelines:  5.7-6.4%  Consistent with prediabetes  >or=6.5%  Consistent with diabetes    High levels of fetal hemoglobin interfere with the HbA1C  assay. Heterozygous hemoglobin variants (HbS, HgC, etc)do  not significantly interfere with  this assay.   However, presence of multiple variants may affect accuracy.     01/20/2022 9.2 (H) 4.0 - 5.6 % Final     Comment:     ADA Screening Guidelines:  5.7-6.4%  Consistent with prediabetes  >or=6.5%  Consistent with diabetes    High levels of fetal hemoglobin interfere with the HbA1C  assay. Heterozygous hemoglobin variants (HbS, HgC, etc)do  not significantly interfere with this assay.   However, presence of multiple variants may affect accuracy.     08/16/2019 8.7 % Final        ASSESSMENT and PLAN:    1. T1DM with hyperglycemia-   Lab today  Continue InPen-    Carb ratio 10, ISF 50, Target 120  Active Ins 3 hrs, Decrease Toujeo to 32 u q  Has Baqsimi @ home  Continue Dexcom G6- notify me for any issues    2. Depression- stable , chronic managed by PCP    3. Vitamin d deficiency- to start ergo this week, Rx perPCP    4. [facility] cardiac dz- mom w MI at 37 yrs      Follow-up:   in 3 months with lab prior

## 2023-01-30 ENCOUNTER — OFFICE VISIT (OUTPATIENT)
Dept: ENDOCRINOLOGY | Facility: CLINIC | Age: 38
End: 2023-01-30
Payer: COMMERCIAL

## 2023-01-30 DIAGNOSIS — E55.9 VITAMIN D DEFICIENCY: ICD-10-CM

## 2023-01-30 DIAGNOSIS — E10.65 UNCONTROLLED TYPE 1 DIABETES MELLITUS WITH HYPERGLYCEMIA: Primary | ICD-10-CM

## 2023-01-30 PROCEDURE — 3052F PR MOST RECENT HEMOGLOBIN A1C LEVEL 8.0 - < 9.0%: ICD-10-PCS | Mod: CPTII,95,, | Performed by: NURSE PRACTITIONER

## 2023-01-30 PROCEDURE — 99214 OFFICE O/P EST MOD 30 MIN: CPT | Mod: 95,,, | Performed by: NURSE PRACTITIONER

## 2023-01-30 PROCEDURE — 3052F HG A1C>EQUAL 8.0%<EQUAL 9.0%: CPT | Mod: CPTII,95,, | Performed by: NURSE PRACTITIONER

## 2023-01-30 PROCEDURE — 99214 PR OFFICE/OUTPT VISIT, EST, LEVL IV, 30-39 MIN: ICD-10-PCS | Mod: 95,,, | Performed by: NURSE PRACTITIONER

## 2023-01-30 RX ORDER — INSULIN GLARGINE 300 U/ML
INJECTION, SOLUTION SUBCUTANEOUS
Qty: 3 PEN | Refills: 12
Start: 2023-01-30 | End: 2023-02-23

## 2023-01-31 ENCOUNTER — PATIENT MESSAGE (OUTPATIENT)
Dept: ENDOCRINOLOGY | Facility: CLINIC | Age: 38
End: 2023-01-31
Payer: COMMERCIAL

## 2023-02-01 DIAGNOSIS — E10.65 UNCONTROLLED TYPE 1 DIABETES MELLITUS WITH HYPERGLYCEMIA: ICD-10-CM

## 2023-02-01 RX ORDER — BLOOD-GLUCOSE TRANSMITTER
EACH MISCELLANEOUS
Qty: 1 EACH | Refills: 3 | Status: SHIPPED | OUTPATIENT
Start: 2023-02-01 | End: 2024-03-20 | Stop reason: SDUPTHER

## 2023-02-08 ENCOUNTER — PATIENT MESSAGE (OUTPATIENT)
Dept: ENDOCRINOLOGY | Facility: CLINIC | Age: 38
End: 2023-02-08
Payer: COMMERCIAL

## 2023-02-24 ENCOUNTER — PATIENT MESSAGE (OUTPATIENT)
Dept: ENDOCRINOLOGY | Facility: CLINIC | Age: 38
End: 2023-02-24
Payer: COMMERCIAL

## 2023-03-15 ENCOUNTER — PATIENT MESSAGE (OUTPATIENT)
Dept: ENDOCRINOLOGY | Facility: CLINIC | Age: 38
End: 2023-03-15
Payer: COMMERCIAL

## 2023-05-09 ENCOUNTER — LAB VISIT (OUTPATIENT)
Dept: LAB | Facility: HOSPITAL | Age: 38
End: 2023-05-09
Attending: NURSE PRACTITIONER
Payer: COMMERCIAL

## 2023-05-09 DIAGNOSIS — E10.65 UNCONTROLLED TYPE 1 DIABETES MELLITUS WITH HYPERGLYCEMIA: ICD-10-CM

## 2023-05-09 LAB
ALBUMIN SERPL BCP-MCNC: 3.6 G/DL (ref 3.5–5.2)
ALP SERPL-CCNC: 56 U/L (ref 55–135)
ALT SERPL W/O P-5'-P-CCNC: 13 U/L (ref 10–44)
ANION GAP SERPL CALC-SCNC: 4 MMOL/L (ref 8–16)
AST SERPL-CCNC: 14 U/L (ref 10–40)
BILIRUB SERPL-MCNC: 0.6 MG/DL (ref 0.1–1)
BUN SERPL-MCNC: 7 MG/DL (ref 6–20)
CALCIUM SERPL-MCNC: 9.4 MG/DL (ref 8.7–10.5)
CHLORIDE SERPL-SCNC: 105 MMOL/L (ref 95–110)
CHOLEST SERPL-MCNC: 167 MG/DL (ref 120–199)
CHOLEST/HDLC SERPL: 2.6 {RATIO} (ref 2–5)
CO2 SERPL-SCNC: 26 MMOL/L (ref 23–29)
CREAT SERPL-MCNC: 0.7 MG/DL (ref 0.5–1.4)
EST. GFR  (NO RACE VARIABLE): >60 ML/MIN/1.73 M^2
ESTIMATED AVG GLUCOSE: 189 MG/DL (ref 68–131)
GLUCOSE SERPL-MCNC: 201 MG/DL (ref 70–110)
HBA1C MFR BLD: 8.2 % (ref 4–5.6)
HDLC SERPL-MCNC: 65 MG/DL (ref 40–75)
HDLC SERPL: 38.9 % (ref 20–50)
LDLC SERPL CALC-MCNC: 87.8 MG/DL (ref 63–159)
NONHDLC SERPL-MCNC: 102 MG/DL
POTASSIUM SERPL-SCNC: 4.4 MMOL/L (ref 3.5–5.1)
PROT SERPL-MCNC: 6.4 G/DL (ref 6–8.4)
SODIUM SERPL-SCNC: 135 MMOL/L (ref 136–145)
TRIGL SERPL-MCNC: 71 MG/DL (ref 30–150)
TSH SERPL DL<=0.005 MIU/L-ACNC: 2.49 UIU/ML (ref 0.4–4)

## 2023-05-09 PROCEDURE — 84443 ASSAY THYROID STIM HORMONE: CPT | Performed by: NURSE PRACTITIONER

## 2023-05-09 PROCEDURE — 80053 COMPREHEN METABOLIC PANEL: CPT | Performed by: NURSE PRACTITIONER

## 2023-05-09 PROCEDURE — 83036 HEMOGLOBIN GLYCOSYLATED A1C: CPT | Performed by: NURSE PRACTITIONER

## 2023-05-09 PROCEDURE — 36415 COLL VENOUS BLD VENIPUNCTURE: CPT | Mod: PO | Performed by: NURSE PRACTITIONER

## 2023-05-09 PROCEDURE — 80061 LIPID PANEL: CPT | Performed by: NURSE PRACTITIONER

## 2023-05-10 ENCOUNTER — PATIENT MESSAGE (OUTPATIENT)
Dept: ENDOCRINOLOGY | Facility: CLINIC | Age: 38
End: 2023-05-10
Payer: COMMERCIAL

## 2023-05-15 NOTE — PROGRESS NOTES
CC: This 37 y.o. female presents for management of diabetes, depression, vitamin d deficiency     HPI: She was diagnosed with T1DM at age 8 years. Hospitalized r/t DM at diagnosis  Family hx of DM: no one   Has two children 20 y/o daughter, 15 y/o son  - tubal ligation 2008 and ablation 2014     No acute events since last visit  Wearing Dexcom G6- see download in media tab  Time in range: 35%  Hypoglycemia <2%  Having pp excursions after all meals, will sometimes dip pre-meal and over corrects for hypos  Pp excursions into 300s post dinner and extend through the night  Still is under dosing her supper to avoid hypo in the middle of the night  GMI:8.2%    Diet: Eats 2-3  Meals a day, snacks- rarely   May skip breakfast, protein shake coffee only  Exercise: none  CURRENT DM MEDS: Toujeo max 32 u qam, Humalog carb ratio 1:10, ISF 50, Target 120 - via Inpen  Timing prandial insulin 5-15 minutes before meals: yes  Vial/pen:  Uses pen  Glucometer type:  One Touch Ultra Mini       Standards of Care:  Eye exam: 10/2022 follows w Dr Mast     Works at a bank      Mom with hypothyroidism  Mom also with MI at age 37 years    ROS:   Gen: Appetite good, weight gain 7 lbs  Eyes: Denies visual disturbances  Resp: no SOB; no POWELL, no cough  Cardiac: + palpitations with anxiety , no chest pain, no edema   GI: No nausea or vomiting, h/o IBS- diarrhea & constipation off and on  /GYN: 2-3+ nocturia, no burning or pain.   MS/Neuro: Denies numbness/ tingling in BLE; Gait steady, speech clear  Psych: Denies drug/ETOH abuse, + h/o depression, anxiety   Other systems: negative.    PE:  GENERAL: Well developed, well nourished.  PSYCH: AAOx3, appropriate mood and affect, pleasant expression, conversant, appears relaxed, well groomed.   EYES: Conjunctiva, corneas clear  NECK: Supple, trachea midline  NEURO: Gait steady  SKIN:   no acanthosis nigracans.  FOOT EXAMINATION: 5/17/2023  No foot deformity, corns or callus formation,  nails in  good condition and well trimmed, no interspace maceration or ulceration noted    Protective sensation intact with 10 gram monofilament.  +2 dorsalis pedis and posterior pulses noted.    Lab Results   Component Value Date    MICALBCREAT 5.2 05/09/2023       Hemoglobin A1C   Date Value Ref Range Status   05/09/2023 8.2 (H) 4.0 - 5.6 % Final     Comment:     ADA Screening Guidelines:  5.7-6.4%  Consistent with prediabetes  >or=6.5%  Consistent with diabetes    High levels of fetal hemoglobin interfere with the HbA1C  assay. Heterozygous hemoglobin variants (HbS, HgC, etc)do  not significantly interfere with this assay.   However, presence of multiple variants may affect accuracy.     01/24/2023 8.2 (H) 4.0 - 5.6 % Final     Comment:     ADA Screening Guidelines:  5.7-6.4%  Consistent with prediabetes  >or=6.5%  Consistent with diabetes    High levels of fetal hemoglobin interfere with the HbA1C  assay. Heterozygous hemoglobin variants (HbS, HgC, etc)do  not significantly interfere with this assay.   However, presence of multiple variants may affect accuracy.     03/31/2022 8.9 (H) 4.0 - 5.6 % Final     Comment:     ADA Screening Guidelines:  5.7-6.4%  Consistent with prediabetes  >or=6.5%  Consistent with diabetes    High levels of fetal hemoglobin interfere with the HbA1C  assay. Heterozygous hemoglobin variants (HbS, HgC, etc)do  not significantly interfere with this assay.   However, presence of multiple variants may affect accuracy.     08/16/2019 8.7 % Final        ASSESSMENT and PLAN:    1. T1DM with hyperglycemia-    Continue InPen- as below      40   7  1100 110    35   7  1700  110   40   7   Active Ins 3 hrs, Decrease Toujeo to 28 u [facility]  Has Baqsimi @ home  Continue Dexcom G6- notify me for any issues  Message me in 1 week to review Dexcom    2. Depression & anxiety- establish care w  PCP    3. Vitamin d deficiency- Currently taking OTC daily- dose unknown, check lab w RTC    4. [facility] cardiac dz- mom w MI  at 37 yrs      Follow-up:   in 3 months with lab prior

## 2023-05-17 ENCOUNTER — TELEPHONE (OUTPATIENT)
Dept: ENDOCRINOLOGY | Facility: CLINIC | Age: 38
End: 2023-05-17
Payer: COMMERCIAL

## 2023-05-17 ENCOUNTER — OFFICE VISIT (OUTPATIENT)
Dept: ENDOCRINOLOGY | Facility: CLINIC | Age: 38
End: 2023-05-17
Payer: COMMERCIAL

## 2023-05-17 VITALS
SYSTOLIC BLOOD PRESSURE: 124 MMHG | BODY MASS INDEX: 24.79 KG/M2 | DIASTOLIC BLOOD PRESSURE: 72 MMHG | HEIGHT: 65 IN | HEART RATE: 78 BPM | WEIGHT: 148.81 LBS

## 2023-05-17 DIAGNOSIS — E10.65 UNCONTROLLED TYPE 1 DIABETES MELLITUS WITH HYPERGLYCEMIA: ICD-10-CM

## 2023-05-17 DIAGNOSIS — E10.65 UNCONTROLLED TYPE 1 DIABETES MELLITUS WITH HYPERGLYCEMIA: Primary | ICD-10-CM

## 2023-05-17 DIAGNOSIS — E55.9 VITAMIN D DEFICIENCY: ICD-10-CM

## 2023-05-17 PROCEDURE — 3061F PR NEG MICROALBUMINURIA RESULT DOCUMENTED/REVIEW: ICD-10-PCS | Mod: CPTII,S$GLB,, | Performed by: NURSE PRACTITIONER

## 2023-05-17 PROCEDURE — 3078F PR MOST RECENT DIASTOLIC BLOOD PRESSURE < 80 MM HG: ICD-10-PCS | Mod: CPTII,S$GLB,, | Performed by: NURSE PRACTITIONER

## 2023-05-17 PROCEDURE — 99214 OFFICE O/P EST MOD 30 MIN: CPT | Mod: S$GLB,,, | Performed by: NURSE PRACTITIONER

## 2023-05-17 PROCEDURE — 95251 PR GLUCOSE MONITOR, 72 HOUR, PHYS INTERP: ICD-10-PCS | Mod: S$GLB,,, | Performed by: NURSE PRACTITIONER

## 2023-05-17 PROCEDURE — 3078F DIAST BP <80 MM HG: CPT | Mod: CPTII,S$GLB,, | Performed by: NURSE PRACTITIONER

## 2023-05-17 PROCEDURE — 1159F MED LIST DOCD IN RCRD: CPT | Mod: CPTII,S$GLB,, | Performed by: NURSE PRACTITIONER

## 2023-05-17 PROCEDURE — 95251 CONT GLUC MNTR ANALYSIS I&R: CPT | Mod: S$GLB,,, | Performed by: NURSE PRACTITIONER

## 2023-05-17 PROCEDURE — 3008F BODY MASS INDEX DOCD: CPT | Mod: CPTII,S$GLB,, | Performed by: NURSE PRACTITIONER

## 2023-05-17 PROCEDURE — 3052F PR MOST RECENT HEMOGLOBIN A1C LEVEL 8.0 - < 9.0%: ICD-10-PCS | Mod: CPTII,S$GLB,, | Performed by: NURSE PRACTITIONER

## 2023-05-17 PROCEDURE — 3066F PR DOCUMENTATION OF TREATMENT FOR NEPHROPATHY: ICD-10-PCS | Mod: CPTII,S$GLB,, | Performed by: NURSE PRACTITIONER

## 2023-05-17 PROCEDURE — 99214 PR OFFICE/OUTPT VISIT, EST, LEVL IV, 30-39 MIN: ICD-10-PCS | Mod: S$GLB,,, | Performed by: NURSE PRACTITIONER

## 2023-05-17 PROCEDURE — 1159F PR MEDICATION LIST DOCUMENTED IN MEDICAL RECORD: ICD-10-PCS | Mod: CPTII,S$GLB,, | Performed by: NURSE PRACTITIONER

## 2023-05-17 PROCEDURE — 3074F SYST BP LT 130 MM HG: CPT | Mod: CPTII,S$GLB,, | Performed by: NURSE PRACTITIONER

## 2023-05-17 PROCEDURE — 3074F PR MOST RECENT SYSTOLIC BLOOD PRESSURE < 130 MM HG: ICD-10-PCS | Mod: CPTII,S$GLB,, | Performed by: NURSE PRACTITIONER

## 2023-05-17 PROCEDURE — 3052F HG A1C>EQUAL 8.0%<EQUAL 9.0%: CPT | Mod: CPTII,S$GLB,, | Performed by: NURSE PRACTITIONER

## 2023-05-17 PROCEDURE — 3061F NEG MICROALBUMINURIA REV: CPT | Mod: CPTII,S$GLB,, | Performed by: NURSE PRACTITIONER

## 2023-05-17 PROCEDURE — 3066F NEPHROPATHY DOC TX: CPT | Mod: CPTII,S$GLB,, | Performed by: NURSE PRACTITIONER

## 2023-05-17 PROCEDURE — 3008F PR BODY MASS INDEX (BMI) DOCUMENTED: ICD-10-PCS | Mod: CPTII,S$GLB,, | Performed by: NURSE PRACTITIONER

## 2023-05-17 RX ORDER — CLOTRIMAZOLE AND BETAMETHASONE DIPROPIONATE 10; .64 MG/G; MG/G
CREAM TOPICAL 2 TIMES DAILY
COMMUNITY
Start: 2022-11-19 | End: 2024-03-12

## 2023-05-17 RX ORDER — SPIRONOLACTONE 25 MG/1
25 TABLET ORAL
COMMUNITY
Start: 2023-04-28

## 2023-05-17 RX ORDER — CLINDAMYCIN PHOSPHATE 11.9 MG/ML
SOLUTION TOPICAL
COMMUNITY
Start: 2023-02-17

## 2023-05-17 RX ORDER — INSULIN GLARGINE 300 U/ML
INJECTION, SOLUTION SUBCUTANEOUS
Qty: 3 PEN | Refills: 12
Start: 2023-05-17 | End: 2024-03-12

## 2023-05-17 NOTE — TELEPHONE ENCOUNTER
Signed Today (5/17/2023):   blood sugar diagnostic (ONETOUCH ULTRA TEST) Strp   Sig: ONE TOUCH TEST STRIP   Disp:  400 strip    Refills:  1   Signed by: Elaine Hernadez DNP, NP

## 2023-05-17 NOTE — TELEPHONE ENCOUNTER
[facility] asking for you to resend rx for strips w/directions.There are no directions in it    Thanks

## 2023-05-17 NOTE — TELEPHONE ENCOUNTER
----- Message from Jodi Milton sent at 5/17/2023 10:12 AM CDT -----  Regarding: needs return call to pharm  Type:  Pharmacy Calling to Clarify an RX    Name of Caller:  [facility]  Pharmacy Name:        [id number] [place] - [address]  [address] [address]  Phone: [phone] Fax: [phone]      Prescription Name:  test strips  What do they need to clarify?:  needs the directions for the script.   Best Call Back Number:  [phone]  Additional Information:

## 2023-05-24 ENCOUNTER — OFFICE VISIT (OUTPATIENT)
Dept: FAMILY MEDICINE | Facility: CLINIC | Age: 38
End: 2023-05-24
Payer: COMMERCIAL

## 2023-05-24 VITALS
BODY MASS INDEX: 24.94 KG/M2 | WEIGHT: 149.69 LBS | HEART RATE: 72 BPM | SYSTOLIC BLOOD PRESSURE: 122 MMHG | OXYGEN SATURATION: 98 % | HEIGHT: 65 IN | DIASTOLIC BLOOD PRESSURE: 78 MMHG

## 2023-05-24 DIAGNOSIS — K58.1 IRRITABLE BOWEL SYNDROME WITH CONSTIPATION: ICD-10-CM

## 2023-05-24 DIAGNOSIS — L70.0 ACNE VULGARIS: ICD-10-CM

## 2023-05-24 DIAGNOSIS — Z23 NEED FOR TDAP VACCINATION: ICD-10-CM

## 2023-05-24 DIAGNOSIS — G47.00 INSOMNIA, UNSPECIFIED TYPE: ICD-10-CM

## 2023-05-24 DIAGNOSIS — Z00.00 WELL ADULT EXAM: Primary | ICD-10-CM

## 2023-05-24 DIAGNOSIS — F41.1 GAD (GENERALIZED ANXIETY DISORDER): ICD-10-CM

## 2023-05-24 DIAGNOSIS — E10.65 UNCONTROLLED TYPE 1 DIABETES MELLITUS WITH HYPERGLYCEMIA: ICD-10-CM

## 2023-05-24 PROBLEM — E10.9 TYPE 1 DIABETES MELLITUS WITHOUT COMPLICATIONS: Status: ACTIVE | Noted: 2020-05-17

## 2023-05-24 PROBLEM — M77.8 SHOULDER TENDINITIS: Status: RESOLVED | Noted: 2017-07-12 | Resolved: 2023-05-24

## 2023-05-24 PROCEDURE — 3008F BODY MASS INDEX DOCD: CPT | Mod: CPTII,S$GLB,, | Performed by: FAMILY MEDICINE

## 2023-05-24 PROCEDURE — 3052F PR MOST RECENT HEMOGLOBIN A1C LEVEL 8.0 - < 9.0%: ICD-10-PCS | Mod: CPTII,S$GLB,, | Performed by: FAMILY MEDICINE

## 2023-05-24 PROCEDURE — 3066F NEPHROPATHY DOC TX: CPT | Mod: CPTII,S$GLB,, | Performed by: FAMILY MEDICINE

## 2023-05-24 PROCEDURE — 99385 PREV VISIT NEW AGE 18-39: CPT | Mod: 25,S$GLB,, | Performed by: FAMILY MEDICINE

## 2023-05-24 PROCEDURE — 99999 PR PBB SHADOW E&M-EST. PATIENT-LVL III: ICD-10-PCS | Mod: PBBFAC,,, | Performed by: FAMILY MEDICINE

## 2023-05-24 PROCEDURE — 1160F PR REVIEW ALL MEDS BY PRESCRIBER/CLIN PHARMACIST DOCUMENTED: ICD-10-PCS | Mod: CPTII,S$GLB,, | Performed by: FAMILY MEDICINE

## 2023-05-24 PROCEDURE — 90715 TDAP VACCINE GREATER THAN OR EQUAL TO 7YO IM: ICD-10-PCS | Mod: S$GLB,,, | Performed by: FAMILY MEDICINE

## 2023-05-24 PROCEDURE — 90471 IMMUNIZATION ADMIN: CPT | Mod: S$GLB,,, | Performed by: FAMILY MEDICINE

## 2023-05-24 PROCEDURE — 3066F PR DOCUMENTATION OF TREATMENT FOR NEPHROPATHY: ICD-10-PCS | Mod: CPTII,S$GLB,, | Performed by: FAMILY MEDICINE

## 2023-05-24 PROCEDURE — 90715 TDAP VACCINE 7 YRS/> IM: CPT | Mod: S$GLB,,, | Performed by: FAMILY MEDICINE

## 2023-05-24 PROCEDURE — 3008F PR BODY MASS INDEX (BMI) DOCUMENTED: ICD-10-PCS | Mod: CPTII,S$GLB,, | Performed by: FAMILY MEDICINE

## 2023-05-24 PROCEDURE — 90471 TDAP VACCINE GREATER THAN OR EQUAL TO 7YO IM: ICD-10-PCS | Mod: S$GLB,,, | Performed by: FAMILY MEDICINE

## 2023-05-24 PROCEDURE — 1159F MED LIST DOCD IN RCRD: CPT | Mod: CPTII,S$GLB,, | Performed by: FAMILY MEDICINE

## 2023-05-24 PROCEDURE — 99385 PR PREVENTIVE VISIT,NEW,18-39: ICD-10-PCS | Mod: 25,S$GLB,, | Performed by: FAMILY MEDICINE

## 2023-05-24 PROCEDURE — 1160F RVW MEDS BY RX/DR IN RCRD: CPT | Mod: CPTII,S$GLB,, | Performed by: FAMILY MEDICINE

## 2023-05-24 PROCEDURE — 3074F SYST BP LT 130 MM HG: CPT | Mod: CPTII,S$GLB,, | Performed by: FAMILY MEDICINE

## 2023-05-24 PROCEDURE — 3078F DIAST BP <80 MM HG: CPT | Mod: CPTII,S$GLB,, | Performed by: FAMILY MEDICINE

## 2023-05-24 PROCEDURE — 3061F NEG MICROALBUMINURIA REV: CPT | Mod: CPTII,S$GLB,, | Performed by: FAMILY MEDICINE

## 2023-05-24 PROCEDURE — 1159F PR MEDICATION LIST DOCUMENTED IN MEDICAL RECORD: ICD-10-PCS | Mod: CPTII,S$GLB,, | Performed by: FAMILY MEDICINE

## 2023-05-24 PROCEDURE — 3078F PR MOST RECENT DIASTOLIC BLOOD PRESSURE < 80 MM HG: ICD-10-PCS | Mod: CPTII,S$GLB,, | Performed by: FAMILY MEDICINE

## 2023-05-24 PROCEDURE — 3061F PR NEG MICROALBUMINURIA RESULT DOCUMENTED/REVIEW: ICD-10-PCS | Mod: CPTII,S$GLB,, | Performed by: FAMILY MEDICINE

## 2023-05-24 PROCEDURE — 3052F HG A1C>EQUAL 8.0%<EQUAL 9.0%: CPT | Mod: CPTII,S$GLB,, | Performed by: FAMILY MEDICINE

## 2023-05-24 PROCEDURE — 3074F PR MOST RECENT SYSTOLIC BLOOD PRESSURE < 130 MM HG: ICD-10-PCS | Mod: CPTII,S$GLB,, | Performed by: FAMILY MEDICINE

## 2023-05-24 PROCEDURE — 99999 PR PBB SHADOW E&M-EST. PATIENT-LVL III: CPT | Mod: PBBFAC,,, | Performed by: FAMILY MEDICINE

## 2023-05-24 RX ORDER — TRAZODONE HYDROCHLORIDE 150 MG/1
TABLET ORAL
Qty: 30 TABLET | Refills: 11 | Status: SHIPPED | OUTPATIENT
Start: 2023-05-24 | End: 2024-01-17

## 2023-05-24 RX ORDER — ALPRAZOLAM 0.25 MG/1
0.25 TABLET ORAL 2 TIMES DAILY PRN
Qty: 30 TABLET | Refills: 0 | Status: SHIPPED | OUTPATIENT
Start: 2023-05-24 | End: 2024-03-12

## 2023-05-24 NOTE — PROGRESS NOTES
"Subjective:       Patient ID: Marah Choudhury is a 37 y.o. female.    Chief Complaint: Establish Care    Here today to establish care with a new PCP.     Patient here today for annual well adult exam.   Tries to eat a healthy diet.  Currently not exercising regularly.    Immunizations: Tdap due  Last Lab work: 5/2023  Colon Ca screening: Due at 43  Breast Ca Screening: MMG at 38  Cervical Ca Screening: PAP 11/22    DM Type I:  Dx at 8.  Seeing Endocrine.  Last HgA1c was 8.2.  She is on Toujeuo and SS Humalog at meals.  She was on a pump and is taking a break for now.  Acne:  recently started on Spironolactone per derm  Chronic Constipation:  for years. Will go 3-5 days and has to take something to have a BM.  ILEANA:  anxiety her whole life.  Can control it most of the time.  She recently had a bad panic attack and had nothing to do for it.  Chronic Insomnia:  she has tried melatonin.  Gets 2-3 hours per night.    Review of Systems   Constitutional:  Negative for activity change, appetite change, fatigue and fever.   Respiratory:  Negative for cough, shortness of breath and wheezing.    Cardiovascular:  Negative for chest pain and palpitations.   Gastrointestinal:  Negative for abdominal pain, constipation, diarrhea and nausea.   Skin:  Negative for pallor and rash.   Neurological:  Negative for dizziness, syncope, light-headedness and headaches.   Psychiatric/Behavioral:  The patient is not nervous/anxious.      Objective:      Vitals:    05/24/23 0838   BP: 122/78   Pulse: 72   SpO2: 98%   Weight: 67.9 kg (149 lb 11.1 oz)   Height: 5' 5" (1.651 m)      Physical Exam  Constitutional:       General: She is not in acute distress.  Cardiovascular:      Rate and Rhythm: Normal rate and regular rhythm.      Heart sounds: Normal heart sounds. No murmur heard.  Pulmonary:      Effort: Pulmonary effort is normal. No respiratory distress.      Breath sounds: Normal breath sounds. No wheezing, rhonchi or rales. "   Musculoskeletal:         General: No swelling.   Skin:     General: Skin is warm and dry.   Neurological:      General: No focal deficit present.      Mental Status: She is alert.   Psychiatric:         Mood and Affect: Mood normal.         Behavior: Behavior normal.         Thought Content: Thought content normal.       Results for orders placed or performed in visit on 05/09/23   Microalbumin/Creatinine Ratio, Urine   Result Value Ref Range    Microalbumin, Urine 7.0 ug/mL    Creatinine, Urine 134.0 15.0 - 325.0 mg/dL    Microalb/Creat Ratio 5.2 0.0 - 30.0 ug/mg      Assessment:       1. Well adult exam    2. Uncontrolled type 1 diabetes mellitus with hyperglycemia    3. Need for Tdap vaccination    4. Acne vulgaris    5. Irritable bowel syndrome with constipation    6. ILEANA (generalized anxiety disorder)    7. Insomnia, unspecified type        Plan:       Well adult exam  Continue to work on dietary improvements (decrease overall calorie intake, decrease sugar and carb intake, decrease animal protein intake)  Continue to exercise at least 30-40 minutes, 3 times per week  Immunizations were discussed and were up to date  Preventative exams were discussed and up to date.  May need repeat MMG soon and will discuss with her GYN   Uncontrolled type 1 diabetes mellitus with hyperglycemia  Mgt per Endocrine.  Continue to work on diety  Need for Tdap vaccination  -     Tdap Vaccine    Acne vulgaris  On Spironolactone per derm  Irritable bowel syndrome with constipation  -     linaCLOtide (LINZESS) 72 mcg Cap capsule; Take 1 capsule (72 mcg total) by mouth before breakfast.  Dispense: 30 capsule; Refill: 11  Trial of Linzess  ILEANA (generalized anxiety disorder)  -     ALPRAZolam (XANAX) 0.25 MG tablet; Take 1 tablet (0.25 mg total) by mouth 2 (two) times daily as needed for Anxiety.  Dispense: 30 tablet; Refill: 0  Xanax for rare PRN use only.  Insomnia, unspecified type  -     traZODone (DESYREL) 150 MG tablet; Take 1/3  "-1 tab po qhs PRN insomnia  Dispense: 30 tablet; Refill: 11  Was on Trazodone in the past with good results, restart at this time.        Medication List with Changes/Refills   New Medications    ALPRAZOLAM (XANAX) 0.25 MG TABLET    Take 1 tablet (0.25 mg total) by mouth 2 (two) times daily as needed for Anxiety.    LINACLOTIDE (LINZESS) 72 MCG CAP CAPSULE    Take 1 capsule (72 mcg total) by mouth before breakfast.    TRAZODONE (DESYREL) 150 MG TABLET    Take 1/3 -1 tab po qhs PRN insomnia   Current Medications    BD ULTRA-FINE MINI PEN NEEDLE 31 GAUGE X 3/16" NDLE    USE TO INJECT 4 TIMES A DAY    BLOOD SUGAR DIAGNOSTIC (ONETOUCH ULTRA TEST) STRP    ONE TOUCH TEST STRIP, checks bg 4 times daily    BLOOD-GLUCOSE METER,CONTINUOUS (DEXCOM G6 ) MISC    Dispense 1     BLOOD-GLUCOSE SENSOR (DEXCOM G6 SENSOR) JON    Dispense 3 sensors/month    BLOOD-GLUCOSE TRANSMITTER (DEXCOM G6 TRANSMITTER) JON    Dispense 1 transmitter. Use 1 transmitter for 90 days    CLINDAMYCIN (CLEOCIN T) 1 % EXTERNAL SOLUTION    SMARTSI Application Topical    CLOTRIMAZOLE-BETAMETHASONE 1-0.05% (LOTRISONE) CREAM    Apply topically 2 (two) times daily.    GLUCAGON (BAQSIMI) 3 MG/ACTUATION SPRY    Use in case of severe hypoglycemia    INSULIN ADMIN SUPPLIES (INPEN, FOR HUMALOG,) INPN    Use as directed    INSULIN GLARGINE U-300 CONC (TOUJEO MAX U-300 SOLOSTAR) 300 UNIT/ML (3 ML) INSULIN PEN    INJECT 28 UNITS INTO THE SKIN AT BEDTIME    INSULIN LISPRO 100 UNIT/ML CRTG    Use with Inpen. Carb ratio 7 plus correction.  Max total daily dose of 50 units.    SPIRONOLACTONE (ALDACTONE) 25 MG TABLET    Take 25 mg by mouth.        "

## 2023-05-26 ENCOUNTER — PATIENT MESSAGE (OUTPATIENT)
Dept: ENDOCRINOLOGY | Facility: CLINIC | Age: 38
End: 2023-05-26
Payer: COMMERCIAL

## 2023-10-10 ENCOUNTER — PATIENT MESSAGE (OUTPATIENT)
Dept: FAMILY MEDICINE | Facility: CLINIC | Age: 38
End: 2023-10-10
Payer: COMMERCIAL

## 2023-11-07 ENCOUNTER — PATIENT MESSAGE (OUTPATIENT)
Dept: ADMINISTRATIVE | Facility: HOSPITAL | Age: 38
End: 2023-11-07
Payer: COMMERCIAL

## 2023-11-07 DIAGNOSIS — E11.9 TYPE 2 DIABETES MELLITUS WITHOUT COMPLICATION, UNSPECIFIED WHETHER LONG TERM INSULIN USE: ICD-10-CM

## 2023-12-10 ENCOUNTER — PATIENT MESSAGE (OUTPATIENT)
Dept: FAMILY MEDICINE | Facility: CLINIC | Age: 38
End: 2023-12-10
Payer: COMMERCIAL

## 2023-12-26 DIAGNOSIS — E10.65 UNCONTROLLED TYPE 1 DIABETES MELLITUS WITH HYPERGLYCEMIA: ICD-10-CM

## 2023-12-26 RX ORDER — BLOOD-GLUCOSE SENSOR
EACH MISCELLANEOUS
Qty: 3 EACH | Refills: 11 | Status: SHIPPED | OUTPATIENT
Start: 2023-12-26

## 2024-01-09 ENCOUNTER — PATIENT MESSAGE (OUTPATIENT)
Dept: ENDOCRINOLOGY | Facility: CLINIC | Age: 39
End: 2024-01-09
Payer: COMMERCIAL

## 2024-01-11 ENCOUNTER — LAB VISIT (OUTPATIENT)
Dept: LAB | Facility: HOSPITAL | Age: 39
End: 2024-01-11
Attending: NURSE PRACTITIONER
Payer: COMMERCIAL

## 2024-01-11 DIAGNOSIS — E10.65 UNCONTROLLED TYPE 1 DIABETES MELLITUS WITH HYPERGLYCEMIA: ICD-10-CM

## 2024-01-11 DIAGNOSIS — E55.9 VITAMIN D DEFICIENCY: ICD-10-CM

## 2024-01-11 LAB
25(OH)D3+25(OH)D2 SERPL-MCNC: 24 NG/ML (ref 30–96)
ESTIMATED AVG GLUCOSE: 189 MG/DL (ref 68–131)
HBA1C MFR BLD: 8.2 % (ref 4–5.6)

## 2024-01-11 PROCEDURE — 83036 HEMOGLOBIN GLYCOSYLATED A1C: CPT | Performed by: NURSE PRACTITIONER

## 2024-01-11 PROCEDURE — 82306 VITAMIN D 25 HYDROXY: CPT | Performed by: NURSE PRACTITIONER

## 2024-01-11 PROCEDURE — 36415 COLL VENOUS BLD VENIPUNCTURE: CPT | Mod: PO | Performed by: NURSE PRACTITIONER

## 2024-01-17 ENCOUNTER — PATIENT MESSAGE (OUTPATIENT)
Dept: ENDOCRINOLOGY | Facility: CLINIC | Age: 39
End: 2024-01-17

## 2024-01-17 ENCOUNTER — OFFICE VISIT (OUTPATIENT)
Dept: ENDOCRINOLOGY | Facility: CLINIC | Age: 39
End: 2024-01-17
Payer: COMMERCIAL

## 2024-01-17 VITALS
HEIGHT: 65 IN | DIASTOLIC BLOOD PRESSURE: 76 MMHG | WEIGHT: 148.5 LBS | HEART RATE: 71 BPM | SYSTOLIC BLOOD PRESSURE: 132 MMHG | BODY MASS INDEX: 24.74 KG/M2

## 2024-01-17 DIAGNOSIS — F32.A DEPRESSION, UNSPECIFIED DEPRESSION TYPE: ICD-10-CM

## 2024-01-17 DIAGNOSIS — E10.65 UNCONTROLLED TYPE 1 DIABETES MELLITUS WITH HYPERGLYCEMIA: Primary | ICD-10-CM

## 2024-01-17 PROCEDURE — 3008F BODY MASS INDEX DOCD: CPT | Mod: CPTII,S$GLB,, | Performed by: NURSE PRACTITIONER

## 2024-01-17 PROCEDURE — 95251 CONT GLUC MNTR ANALYSIS I&R: CPT | Mod: S$GLB,,, | Performed by: NURSE PRACTITIONER

## 2024-01-17 PROCEDURE — 1159F MED LIST DOCD IN RCRD: CPT | Mod: CPTII,S$GLB,, | Performed by: NURSE PRACTITIONER

## 2024-01-17 PROCEDURE — 99214 OFFICE O/P EST MOD 30 MIN: CPT | Mod: S$GLB,,, | Performed by: NURSE PRACTITIONER

## 2024-01-17 PROCEDURE — 3075F SYST BP GE 130 - 139MM HG: CPT | Mod: CPTII,S$GLB,, | Performed by: NURSE PRACTITIONER

## 2024-01-17 PROCEDURE — 3052F HG A1C>EQUAL 8.0%<EQUAL 9.0%: CPT | Mod: CPTII,S$GLB,, | Performed by: NURSE PRACTITIONER

## 2024-01-17 PROCEDURE — 3078F DIAST BP <80 MM HG: CPT | Mod: CPTII,S$GLB,, | Performed by: NURSE PRACTITIONER

## 2024-01-17 RX ORDER — INSULIN PMP CART,AUT,G6/7,CNTR
1 EACH SUBCUTANEOUS
Qty: 1 EACH | Refills: 0 | Status: SHIPPED | OUTPATIENT
Start: 2024-01-17

## 2024-01-17 RX ORDER — INSULIN PEN,REUSABLE,BT LISPRO
INSULIN PEN (EA) SUBCUTANEOUS
Qty: 1 EACH | Refills: 1 | Status: SHIPPED | OUTPATIENT
Start: 2024-01-17 | End: 2024-05-13

## 2024-01-17 RX ORDER — INSULIN PMP CART,AUT,G6/7,CNTR
1 EACH SUBCUTANEOUS
Qty: 30 EACH | Refills: 4 | Status: SHIPPED | OUTPATIENT
Start: 2024-01-17

## 2024-01-17 NOTE — PROGRESS NOTES
CC: This 38 y.o. female presents for management of diabetes, depression, vitamin d deficiency     HPI: She was diagnosed with T1DM at age 8 years. Hospitalized r/t DM at diagnosis  Family hx of DM: no one   Has two children 20 y/o daughter, 13 y/o son  - tubal ligation 2008 and ablation 2014     Last seen 5/2023  Wearing Dexcom G6- see download in media tab  18% Very High  50% High  31% In Range  <1% Low  0% Very Low  Having pp excursions after all meals,    Pp excursions may extend through the night  Eating more carbs   GMI:8.3%    Diet: Eats 2-3  Meals a day, snacks- more when working from home  Cooking less healthy r/t cost  May skip breakfast, protein shake coffee only  Exercise: none  CURRENT DM MEDS: Toujeo max 28 u qam, Humalog via inpen        40   7  1100 110    35   7  1700  110   40   7   Active Ins 3 hrs,   Timing prandial insulin 5-15 minutes before meals: yes  Vial/pen:  Uses pen  Glucometer type:  One Touch Ultra Mini       Standards of Care:  Eye exam: 10/2022 follows w Dr Mast - Has an appt today    Works at a [employer]      Mom with hypothyroidism  Mom also with MI at age 37 years    ROS:   Gen: Appetite good, weight stable  Eyes: Denies visual disturbances  Resp: no SOB; no POWELL  Cardiac: + palpitations with anxiety , no chest pain   GI: No nausea or vomiting, h/o IBS- diarrhea & constipation off and on  /GYN: 2-3+ nocturia, no burning or pain.   MS/Neuro: Denies numbness/ tingling in BLE; Gait steady, speech clear  Psych: Denies drug/ETOH abuse, + h/o depression, anxiety   Other systems: negative.    PE:  GENERAL: Well developed, well nourished.  PSYCH: AAOx3, appropriate mood and affect, pleasant expression, conversant, appears relaxed, well groomed.   EYES: Conjunctiva, corneas clear  NECK: Supple, trachea midline  NEURO: Gait steady  SKIN:   no acanthosis nigracans.  FOOT EXAMINATION: 5/17/2023  No foot deformity, corns or callus formation,  nails in good condition and well trimmed, no  interspace maceration or ulceration noted    Protective sensation intact with 10 gram monofilament.  +2 dorsalis pedis and posterior pulses noted.    Lab Results   Component Value Date    MICALBCREAT 5.2 05/09/2023       Hemoglobin A1C   Date Value Ref Range Status   01/11/2024 8.2 (H) 4.0 - 5.6 % Final     Comment:     ADA Screening Guidelines:  5.7-6.4%  Consistent with prediabetes  >or=6.5%  Consistent with diabetes    High levels of fetal hemoglobin interfere with the HbA1C  assay. Heterozygous hemoglobin variants (HbS, HgC, etc)do  not significantly interfere with this assay.   However, presence of multiple variants may affect accuracy.     05/09/2023 8.2 (H) 4.0 - 5.6 % Final     Comment:     ADA Screening Guidelines:  5.7-6.4%  Consistent with prediabetes  >or=6.5%  Consistent with diabetes    High levels of fetal hemoglobin interfere with the HbA1C  assay. Heterozygous hemoglobin variants (HbS, HgC, etc)do  not significantly interfere with this assay.   However, presence of multiple variants may affect accuracy.     01/24/2023 8.2 (H) 4.0 - 5.6 % Final     Comment:     ADA Screening Guidelines:  5.7-6.4%  Consistent with prediabetes  >or=6.5%  Consistent with diabetes    High levels of fetal hemoglobin interfere with the HbA1C  assay. Heterozygous hemoglobin variants (HbS, HgC, etc)do  not significantly interfere with this assay.   However, presence of multiple variants may affect accuracy.     08/16/2019 8.7 % Final        ASSESSMENT and PLAN:    1. T1DM with hyperglycemia-    Change InPen settings- as below  MN  110     40   6  1100 110    35   6  1700  110   35   6  2200  110   35   6  Active Ins 3 hrs,   Continue Toujeo to 28 u qhs- would very much benefit from closed loop pump  Has Baqsimi @ home  Continue Dexcom G6- notify me for any issues- for Now we're pricing Omni Pod 5 - if no Omni pod will upgrade to G7 Dexcom      2. Depression & anxiety-  stable, chronic     3. [facility] cardiac dz- mom w MI at 37  yrs      Follow-up:   in 3 months with A1C, CMP, Lipid, UMCR      ADDENDUM-  Pump start settings  Basal  MN 0.75  0700  0.9  Carb ratio 6  ISF 40  Active Ins 3 hrs  Target bg 120

## 2024-01-18 ENCOUNTER — PATIENT MESSAGE (OUTPATIENT)
Dept: ENDOCRINOLOGY | Facility: CLINIC | Age: 39
End: 2024-01-18
Payer: COMMERCIAL

## 2024-01-18 DIAGNOSIS — E10.65 UNCONTROLLED TYPE 1 DIABETES MELLITUS WITH HYPERGLYCEMIA: Primary | ICD-10-CM

## 2024-01-18 RX ORDER — INSULIN LISPRO 100 [IU]/ML
INJECTION, SOLUTION INTRAVENOUS; SUBCUTANEOUS
Qty: 30 ML | Refills: 12 | Status: SHIPPED | OUTPATIENT
Start: 2024-01-18

## 2024-01-31 ENCOUNTER — PATIENT MESSAGE (OUTPATIENT)
Dept: DIABETES | Facility: CLINIC | Age: 39
End: 2024-01-31
Payer: COMMERCIAL

## 2024-01-31 ENCOUNTER — TELEPHONE (OUTPATIENT)
Dept: DIABETES | Facility: CLINIC | Age: 39
End: 2024-01-31
Payer: COMMERCIAL

## 2024-01-31 NOTE — TELEPHONE ENCOUNTER
Spoke with patient and assisted in scheduling her Omni Pod 5 training in [place].  She is scheduled with Jessica Carrington on Tuesday 2/6 in [place].  She will bring her intro kit and a vial of insulin to the appointment and instructed her to hold her Toujeo dose the morning of the training.  She verbalized understanding.  Encouraged her to call in interim with any questions/concerns.

## 2024-01-31 NOTE — TELEPHONE ENCOUNTER
Patient will be transitioning to Omni Pod 5.  She is currently using an Inpen.  She is scheduled for training on Tuesday 2/6.  Can you write starting orders ahead of her visit? Verified that she is still taking 28 units of Toujeo max daily which she will hold the morning of training.   Thanks

## 2024-01-31 NOTE — TELEPHONE ENCOUNTER
When I spoke to her I let her know she will not need syringes as each pod comes with one so no need to send those in for her

## 2024-02-02 ENCOUNTER — PATIENT MESSAGE (OUTPATIENT)
Dept: DIABETES | Facility: CLINIC | Age: 39
End: 2024-02-02
Payer: COMMERCIAL

## 2024-02-06 ENCOUNTER — CLINICAL SUPPORT (OUTPATIENT)
Dept: DIABETES | Facility: CLINIC | Age: 39
End: 2024-02-06
Payer: COMMERCIAL

## 2024-02-06 DIAGNOSIS — E10.65 UNCONTROLLED TYPE 1 DIABETES MELLITUS WITH HYPERGLYCEMIA: ICD-10-CM

## 2024-02-06 PROCEDURE — 99999 PR PBB SHADOW E&M-EST. PATIENT-LVL III: CPT | Mod: PBBFAC,,, | Performed by: NUTRITIONIST

## 2024-02-06 PROCEDURE — G0108 DIAB MANAGE TRN  PER INDIV: HCPCS | Mod: S$GLB,,, | Performed by: NUTRITIONIST

## 2024-02-06 NOTE — PROGRESS NOTES
Diabetes Care Specialist Progress Note  Author: Jessica Carrington RD  Date: 2/6/2024    Referral 1/31/24    Program Intake  Reason for Diabetes Program Visit:: Intervention  Type of Intervention:: Individual  Individual: Device Training  Device Training: Insulin Pump Start (transition for InPen to Omnipod 5 with Dexcom G6)  Current diabetes risk level:: moderate  In the last 12 months, have you:: none  Permission to speak with others about care:: no  Continuous Glucose Monitoring  Patient has CGM: Yes  Personal CGM type::  (Dexcom G6 via PHONE)    Lab Results   Component Value Date    HGBA1C 8.2 (H) 01/11/2024       Clinical    Patient Health Rating  Compared to other people your age, how would you rate your health?: Good    Problem Review  Reviewed Problem List with Patient: yes  Active comorbidities affecting diabetes self-care.: no  Reviewed health maintenance: yes    Clinical Assessment  Current Diabetes Treatment: Insulin (In Pen)  Have you ever experienced hypoglycemia (low blood sugar)?: yes (pt indicates low BG usually overnight)  In the last month, how often have you experienced low blood sugar?: once a week  Have you ever been hospitalized because your blood sugar was too low?: no  Have you ever experienced hyperglycemia (high blood sugar)?: yes  In the last month, how often have you experienced high blood sugar?: once a week  Are you able to tell when your blood sugar is high?: Yes  What are your symptoms?: thirst  Have you ever been hospitalized because your blood sugar was high?: no    Medication Information  How do you obtain your medications?: Patient drives  How many days a week do you miss your medications?: Never  Do you sometimes have difficulty refilling your medications?: No  Medication adherence impacting ability to self-manage diabetes?: No    Labs  Do you have regular lab work to monitor your medications?: Yes  Type of Regular Lab Work: A1c, Cholesterol, CBC, Other  Where do you get your labs  drawn?: [facility]  Lab Compliance Barriers: No         Additional Social History    Support  Does anyone support you with your diabetes care?: yes  Who supports you?: self  Who takes you to your medical appointments?: self  Does the current support meet the patient's needs?: Yes  Is Support an area impacting ability to self-manage diabetes?: No    Access to Mass Media & Technology  Does the patient have access to any of the following devices or technologies?: Smart phone, Home computer, Internet Access  Media or technology needs impacting ability to self-manage diabetes?: No    Cognitive/Behavioral Health  Alert and Oriented: Yes  Difficulty Thinking: No  Requires Prompting: No  Requires assistance for routine expression?: No  Cognitive or behavioral barriers impacting ability to self-manage diabetes?: No    Culture/Confucianism  Culture or Amish beliefs that may impact ability to access healthcare: No    Communication  Language preference: English  Hearing Problems: No  Vision Problems: No  Communication needs impacting ability to self-manage diabetes?: No    Health Literacy  Preferred Learning Method: Face to Face, Demonstration, Hands On  How often do you need to have someone help you read instructions, pamphlets, or written material from your doctor or pharmacy?: Never  Health literacy needs impacting ability to self-manage diabetes?: No      Diabetes Self-Management Skills Assessment    Diabetes Disease Process/Treatment Options  Patient/caregiver able to state what happens when someone has diabetes.: yes  Patient/caregiver knows what type of diabetes they have.: yes  Diabetes Type : Type I  Patient/caregiver able to identify at least three signs and symptoms of diabetes.: yes  Identified signs and symptoms:: frequent infections, frequent urination, increased thirst, fatigue  Patient able to identify at least three risk factors for diabetes.: yes  Identified risk factors:: family history  Diabetes Disease  Process/Treatment Options: Skills Assessment Completed: Yes  Assessment indicates:: Adequate understanding  Area of need?: No    Medications  Patient is able to describe current diabetes management routine.: yes  Diabetes management routine:: insulin  Patient is able to identify current diabetes medications, dosages, and appropriate timing of medications.: yes  Patient understands the purpose of the medications taken for diabetes.: yes  Patient reports problems or concerns with current medication regimen.: yes  Medication regimen problems/concerns:: does not feel like regimen is working (start/train for Omnipod 5 today)  Medication Skills Assessment Completed:: Yes  Assessment indicates:: Instruction Needed  Area of need?: Yes    Home Blood Glucose Monitoring  Patient states that blood sugar is checked at home daily.: yes  Monitoring Method:: personal continuous glucose monitor  Personal CGM type::  (Dexcom G6 via PHONE)  Patient is able to use personal CGM appropriately.: yes  CGM Report reviewed?: no (unable to download)  Home Blood Glucose Monitoring Skills Assessment Completed: : Yes  Assessment indicates:: Adequate understanding  Area of need?: No    Acute Complications  Acute Complications Skills Assessment Completed: : No  Deffered due to:: Time  Area of need?: Yes    Chronic Complications  Patient can identify major chronic complications of diabetes.: yes  Stated chronic complications:: heart disease/heart attack, kidney disease, neuropathy/nerve damage, retinopathy, stroke  Patient can identify ways to prevent or delay diabetes complications.: yes  Stated ways to prevent complications:: healthy eating and regular activity, controlling blood sugar  Patient is aware that having diabetes increases risk of heart disease?: Yes  Patient is aware that heart disease is the leading cause of death and disability in people with diabetes?: Yes  Patient able to state risk factors for heart disease?: Yes  Patient stated  risk factors for heart disease:: High blood pressure, High cholesterol, Diet, Limited activity, Medication non-adherance, Having diabetes  Patient is taking statin?: No  Do you want more information on Statins?: No  Chronic Complications Skills Assessment Completed: : Yes  Assessment indicates:: Adequate understanding  Area of need?: No    Psychosocial/Coping  Patient can identify ways of coping with chronic disease.: yes  Patient-stated ways of coping with chronic disease:: support from loved ones  Psychosocial/Coping Skills Assessment Completed: : Yes  Assessment indicates:: Adequate understanding  Area of need?: No      Assessment Summary and Plan    Based on today's diabetes care assessment, the following areas of need were identified:          2/6/2024    12:01 AM   Social   Support No   Access to Mass Media/Tech No   Cognitive/Behavioral Health No   Culture/Sikh No   Communication No   Health Literacy No            2/6/2024    12:01 AM   Clinical   Medication Adherence No   Lab Compliance No            2/6/2024    12:01 AM   Diabetes Self-Management Skills   Diabetes Disease Process/Treatment Options No   Medication Yes--see Care Plan   Home Blood Glucose Monitoring No   Acute Complications Yes--review at future appt as needed   Chronic Complications No   Psychosocial/Coping No          Today's interventions were provided through individual discussion, instruction, and written materials were provided.      Patient verbalized understanding of instruction and written materials.  Pt was able to return back demonstration of instructions today. Patient understood key points, needs reinforcement and further instruction.     Diabetes Self-Management Care Plan:    Today's Diabetes Self-Management Care Plan was developed with Marah's input. Marah has agreed to work toward the following goal(s) to improve his/her overall diabetes control.      Care Plan: Diabetes Management   Updates made since 1/7/2024 12:00 AM         Problem: Medications         Goal: Instructed on use of Omnipod 5 integrated with Dexcom G6--patient transitioning from InPen    Start Date: 2/6/2024   Expected End Date: 5/6/2024   Priority: High   Barriers: No Barriers Identified     DIABETES EDUCATION NOTE  OMNIPOD INSULIN PUMP START    Patient here today for insulin pump training and will be starting an OmniPod Insulin pump.  Pump training was provided per OmniPod protocol.    Pt has been using InPen and takes basal insulin in the morning; did not take any basal this morning before appt.     Details of pump therapy were covered to include basic features of PDM programming, filling of pod / reservoir, automatic pod priming and insertion, use of the Dexcom G6 meter, setting basal, bolus and other features in the set up menu.  Pt demonstrated the ability to program CONTROLLER and fill pod reservoir with rapid acting insulin, prime infusion set and inserted pod to LEFT ABDOMEN    Instructed on use of basic pump features. Reviewed site selection of pods, rotation of sites and hard stop on PDM to change every 72 hrs + low insulin in resevoir.   Instructed that insulin vial is good out of refrigeration for 30 days.   Reviewed treatment of hypoglycemia, hyperglycemia; sick day care, DKA and troubleshooting of pump.  Omni Pod 24 hour support line provided.   Patient instructed on Glooko download, OmniPod packet included download instructions.   Explained Automated Mode vs Manual Mode and Limited Mode.  Pt understands that after filling pod, need to be in Automated Mode, and pt was able to demonstrate how to Switch Modes if not prompted to change modes.    Emphasized importance of bolusing 5-15 minutes before eating.  Pt admits this is an issue for her but she will attempt to do better    INITIAL SETTINGS :  Basal rate:   MN  0.75  0700  0.9    Maximum basal rate: 2.0     Bolus Menu:    Blood glucose Targets:  12 am- 12am = 120    Correction factor:  12 am- 12 am =  40    Carb Ratio   12 am- 12 am = 6  Active insulin/IOB: 3 hours  Maximum bolus = 15    Dexcom transmitter:   [id number]  Starting B     Ending B       Supplies From:  [facility]    Written materials provided. Patient/caregiver verbalized understanding of all instructions given.     Podder Central/Omnipod ID  Username: [email or web address]  Password: [email or web address]  Username: [email or web address]  [email or web address]     4-digit screen code: [id number]    BACK UP PLAN: InPen           Follow Up Plan     Follow up in about 2 weeks (around 2024) for f/u on 24 to download glooko reports and review use of pump with pt.  .    Today's care plan and follow up schedule was discussed with patient.  Marah verbalized understanding of the care plan, goals, and agrees to follow up plan.        The patient was encouraged to communicate with his/her health care provider/physician and care team regarding his/her condition(s) and treatment.  I provided the patient with my contact information today and encouraged to contact me via phone or [facility] Portal as needed.     Length of Visit   Total Time: 75 Minutes

## 2024-02-09 ENCOUNTER — PATIENT MESSAGE (OUTPATIENT)
Dept: ENDOCRINOLOGY | Facility: CLINIC | Age: 39
End: 2024-02-09
Payer: COMMERCIAL

## 2024-02-16 ENCOUNTER — TELEPHONE (OUTPATIENT)
Dept: ENDOCRINOLOGY | Facility: CLINIC | Age: 39
End: 2024-02-16
Payer: COMMERCIAL

## 2024-02-16 NOTE — TELEPHONE ENCOUNTER
Left message for patient to call back   Called [employer]. According nurse Dave Stringer already took care of this

## 2024-02-16 NOTE — TELEPHONE ENCOUNTER
----- Message from Ekaterina Wayne sent at 2/16/2024 10:38 AM CST -----  [employer]  Hiral  is calling to speak with someone in provider office regarding the patient insulin medication states they fax an order over for the patient she is asking for a return call please call.

## 2024-02-20 ENCOUNTER — PATIENT OUTREACH (OUTPATIENT)
Dept: DIABETES | Facility: CLINIC | Age: 39
End: 2024-02-20
Payer: COMMERCIAL

## 2024-02-20 ENCOUNTER — PATIENT MESSAGE (OUTPATIENT)
Dept: DIABETES | Facility: CLINIC | Age: 39
End: 2024-02-20

## 2024-02-20 ENCOUNTER — TELEPHONE (OUTPATIENT)
Dept: DIABETES | Facility: CLINIC | Age: 39
End: 2024-02-20
Payer: COMMERCIAL

## 2024-02-20 NOTE — PATIENT INSTRUCTIONS
Walked pt through changing pump settings per Elaine Hernadez DNP, NP rec:    Change target BG to 110  (not 120)  Change Carb ratio to 5 pm-12 mn to 5.5 (not 6)    Pt was able to make changes as above and verbalized exactly what she saw on each screen of her Controller.  Double checked all info with pt.  Pt knows how to reach Educator and Elaine if needed.

## 2024-02-20 NOTE — TELEPHONE ENCOUNTER
Pt coming in this morning for 2 week f/u after starting the Omnipod 5 w/G6 integrated on 2/6/24.  Report attached. Please advise of any changes.

## 2024-02-20 NOTE — PATIENT INSTRUCTIONS
"Called pt when she did not show for 2 week f/u after starting Omnipod 5 ith Dexcom G6 integrated on 2/6/24.  Pt states she is very happy with the new device; wishes it did not take 2 hours for CGM  to warm up.  When she changes sensor, she makes sure she is manually checking BG and if high, enters the level into her Controller.  She is VERY interested in the G7 when it will integrate with the Omnipod 5.    Pt did acknowledge that she is running higher in late/early evening.  This is when she consumes the most carbs.   Pt admits that a few times her BG did spike a lot and contributes this to (1) sushi that she probably did not carb count accurately for; and (2) a glass of chocolate wine.    We also discussed amount of insulin to fill pod with.  Pt was using 200 units but was wasting some, so started to fill with less.  Based on report, pt using just under 30 units daily; with amount for priming, pt should put in minimum of 120 units.  Educator rec 120-150 units.  Pt wearing pods on her hip; recently ordered clear "covers" for extra adhesion.  Pt has been changing pods every 4 days but with slightly less insulin hopefully will be changing every 3 days from now on.  Overall, pt very happy with device; feels a sense of "freedom" with device.      Average TDD= 27.3 u/day    Basal =  71%  =  19.4 u/day  Bolus = 29  %  =  8  u/day       CGM data  Avg BG= 164  Very High 14%  High 0%  TIR  86%  Low 9%  Very Low 0%    "

## 2024-02-26 ENCOUNTER — PATIENT MESSAGE (OUTPATIENT)
Dept: FAMILY MEDICINE | Facility: CLINIC | Age: 39
End: 2024-02-26
Payer: COMMERCIAL

## 2024-03-12 ENCOUNTER — HOSPITAL ENCOUNTER (OUTPATIENT)
Dept: RADIOLOGY | Facility: HOSPITAL | Age: 39
Discharge: HOME | End: 2024-03-12
Attending: NURSE PRACTITIONER
Payer: COMMERCIAL

## 2024-03-12 ENCOUNTER — OFFICE VISIT (OUTPATIENT)
Dept: FAMILY MEDICINE | Facility: CLINIC | Age: 39
End: 2024-03-12
Payer: COMMERCIAL

## 2024-03-12 VITALS
SYSTOLIC BLOOD PRESSURE: 128 MMHG | DIASTOLIC BLOOD PRESSURE: 80 MMHG | TEMPERATURE: 98 F | HEIGHT: 65 IN | BODY MASS INDEX: 25.68 KG/M2 | OXYGEN SATURATION: 95 % | WEIGHT: 154.13 LBS | HEART RATE: 71 BPM

## 2024-03-12 DIAGNOSIS — G89.29 CHRONIC LEFT SHOULDER PAIN: Primary | ICD-10-CM

## 2024-03-12 DIAGNOSIS — G56.01 CARPAL TUNNEL SYNDROME ON RIGHT: ICD-10-CM

## 2024-03-12 DIAGNOSIS — M25.512 CHRONIC LEFT SHOULDER PAIN: Primary | ICD-10-CM

## 2024-03-12 DIAGNOSIS — G89.29 CHRONIC LEFT SHOULDER PAIN: ICD-10-CM

## 2024-03-12 DIAGNOSIS — M25.512 CHRONIC LEFT SHOULDER PAIN: ICD-10-CM

## 2024-03-12 PROCEDURE — 3079F DIAST BP 80-89 MM HG: CPT | Mod: CPTII,S$GLB,, | Performed by: NURSE PRACTITIONER

## 2024-03-12 PROCEDURE — 3008F BODY MASS INDEX DOCD: CPT | Mod: CPTII,S$GLB,, | Performed by: NURSE PRACTITIONER

## 2024-03-12 PROCEDURE — 1159F MED LIST DOCD IN RCRD: CPT | Mod: CPTII,S$GLB,, | Performed by: NURSE PRACTITIONER

## 2024-03-12 PROCEDURE — 3052F HG A1C>EQUAL 8.0%<EQUAL 9.0%: CPT | Mod: CPTII,S$GLB,, | Performed by: NURSE PRACTITIONER

## 2024-03-12 PROCEDURE — 99999 PR PBB SHADOW E&M-EST. PATIENT-LVL V: CPT | Mod: PBBFAC,,, | Performed by: NURSE PRACTITIONER

## 2024-03-12 PROCEDURE — 73030 X-RAY EXAM OF SHOULDER: CPT | Mod: 26,LT,, | Performed by: RADIOLOGY

## 2024-03-12 PROCEDURE — 99213 OFFICE O/P EST LOW 20 MIN: CPT | Mod: S$GLB,,, | Performed by: NURSE PRACTITIONER

## 2024-03-12 PROCEDURE — 73030 X-RAY EXAM OF SHOULDER: CPT | Mod: TC,FY,PO,LT

## 2024-03-12 PROCEDURE — 3074F SYST BP LT 130 MM HG: CPT | Mod: CPTII,S$GLB,, | Performed by: NURSE PRACTITIONER

## 2024-03-12 NOTE — PROGRESS NOTES
Subjective:       Patient ID: Marah Choudhury is a 38 y.o. female.    Chief Complaint: Pain (Left shoulder pain for 3 months or longer, pain changes, worse at night or staying still for a long time. )    Shoulder Pain   The pain is present in the left shoulder. This is a chronic problem. Episode onset: >3 months ago. There has been no history of extremity trauma. The problem occurs constantly. The problem has been gradually worsening. The quality of the pain is described as aching and sharp. Associated symptoms include a limited range of motion. Pertinent negatives include no fever. The symptoms are aggravated by activity. She has tried NSAIDS and acetaminophen for the symptoms. The treatment provided no relief. There is no history of Injuries to Extremity.         Vitals:    03/12/24 0854   BP: 128/80   Pulse: 71   Temp: 97.9 °F (36.6 °C)     Review of Systems   Constitutional:  Negative for fever.   Musculoskeletal:  Positive for arthralgias.   Skin:  Negative for rash.       Past Medical History:   Diagnosis Date    Vitamin D deficiency      Objective:      Physical Exam  Constitutional:       General: She is not in acute distress.     Appearance: She is well-developed. She is not ill-appearing, toxic-appearing or diaphoretic.   HENT:      Right Ear: Hearing normal.      Left Ear: Hearing normal.   Pulmonary:      Effort: No tachypnea or respiratory distress.   Musculoskeletal:      Left shoulder: Normal range of motion.   Skin:     Coloration: Skin is not pale.   Neurological:      Mental Status: She is alert and oriented to person, place, and time.   Psychiatric:         Speech: Speech normal.         Behavior: Behavior normal.         Thought Content: Thought content normal.         Judgment: Judgment normal.         Assessment:       1. Chronic left shoulder pain    2. Carpal tunnel syndrome on right        Plan:       Chronic left shoulder pain  -     X-Ray Shoulder 2 or More Views Left; Future; Expected  "date: 2024  -     MRI Shoulder Without Contrast Left; Future; Expected date: 2024  -     Ambulatory referral/consult to Orthopedics; Future; Expected date: 2024    Carpal tunnel syndrome on right  -     Ambulatory referral/consult to Orthopedics; Future; Expected date: 2024          Imaging, ortho eval       Medication List with Changes/Refills   Current Medications    ALPRAZOLAM (XANAX) 0.25 MG TABLET    Take 1 tablet (0.25 mg total) by mouth 2 (two) times daily as needed for Anxiety.    BD ULTRA-FINE MINI PEN NEEDLE 31 GAUGE X 3/16" NDLE    USE TO INJECT 4 TIMES A DAY    BLOOD SUGAR DIAGNOSTIC (ONETOUCH ULTRA TEST) STRP    ONE TOUCH TEST STRIP, checks bg 4 times daily    BLOOD-GLUCOSE METER,CONTINUOUS (DEXCOM G6 ) MISC    Dispense 1     BLOOD-GLUCOSE SENSOR (DEXCOM G6 SENSOR) JON    Change sensor every 10 days    BLOOD-GLUCOSE TRANSMITTER (DEXCOM G6 TRANSMITTER) JON    Dispense 1 transmitter. Use 1 transmitter for 90 days    CLINDAMYCIN (CLEOCIN T) 1 % EXTERNAL SOLUTION    SMARTSI Application Topical    CLOTRIMAZOLE-BETAMETHASONE 1-0.05% (LOTRISONE) CREAM    Apply topically 2 (two) times daily.    GLUCAGON (BAQSIMI) 3 MG/ACTUATION SPRY    Use in case of severe hypoglycemia    INSULIN ADMIN SUPPLIES (INPEN, FOR HUMALOG,) INPN    Use as directed    INSULIN GLARGINE U-300 CONC (TOUJEO MAX U-300 SOLOSTAR) 300 UNIT/ML (3 ML) INSULIN PEN    INJECT 28 UNITS INTO THE SKIN AT BEDTIME    INSULIN LISPRO (HUMALOG U-100 INSULIN) 100 UNIT/ML INJECTION    Uses 70 u/daily in insulin pump    INSULIN LISPRO 100 UNIT/ML CRTG    Use with Inpen. Carb ratio 7 plus correction.  Max total daily dose of 50 units.    INSULIN PEN,REUSABLE,BT,LISPRO (INPEN, FOR HUMALOG, PINK) INPN    Dispense 1 Inpen, use as directed    INSULIN PUMP CART,AUTO,BT-CNTR (OMNIPOD 5 G6 INTRO KIT, GEN 5,) CRTG    Inject 1 Device into the skin 3 (three) times daily before meals.    INSULIN PUMP CART,AUTOMATED,BT (OMNIPOD " 5 G6 PODS, GEN 5,) CRTG    Inject 1 Device into the skin Every 3 (three) days.    LINACLOTIDE (LINZESS) 72 MCG CAP CAPSULE    Take 1 capsule (72 mcg total) by mouth before breakfast.    SPIRONOLACTONE (ALDACTONE) 25 MG TABLET    Take 25 mg by mouth.

## 2024-03-15 ENCOUNTER — HOSPITAL ENCOUNTER (OUTPATIENT)
Dept: RADIOLOGY | Facility: HOSPITAL | Age: 39
Discharge: HOME | End: 2024-03-15
Attending: NURSE PRACTITIONER
Payer: COMMERCIAL

## 2024-03-15 DIAGNOSIS — M25.512 CHRONIC LEFT SHOULDER PAIN: ICD-10-CM

## 2024-03-15 DIAGNOSIS — G89.29 CHRONIC LEFT SHOULDER PAIN: ICD-10-CM

## 2024-03-15 PROCEDURE — 73221 MRI JOINT UPR EXTREM W/O DYE: CPT | Mod: TC,PO,LT

## 2024-03-15 PROCEDURE — 73221 MRI JOINT UPR EXTREM W/O DYE: CPT | Mod: 26,LT,, | Performed by: RADIOLOGY

## 2024-03-17 DIAGNOSIS — S46.812A TEAR OF LEFT INFRASPINATUS TENDON, INITIAL ENCOUNTER: Primary | ICD-10-CM

## 2024-03-19 ENCOUNTER — PATIENT MESSAGE (OUTPATIENT)
Dept: FAMILY MEDICINE | Facility: CLINIC | Age: 39
End: 2024-03-19
Payer: COMMERCIAL

## 2024-03-20 DIAGNOSIS — E10.65 UNCONTROLLED TYPE 1 DIABETES MELLITUS WITH HYPERGLYCEMIA: ICD-10-CM

## 2024-03-20 RX ORDER — BLOOD-GLUCOSE TRANSMITTER
EACH MISCELLANEOUS
Qty: 1 EACH | Refills: 3 | Status: SHIPPED | OUTPATIENT
Start: 2024-03-20

## 2024-03-30 DIAGNOSIS — E10.65 UNCONTROLLED TYPE 1 DIABETES MELLITUS WITH HYPERGLYCEMIA: ICD-10-CM

## 2024-04-01 ENCOUNTER — PATIENT MESSAGE (OUTPATIENT)
Dept: ENDOCRINOLOGY | Facility: CLINIC | Age: 39
End: 2024-04-01
Payer: COMMERCIAL

## 2024-04-01 ENCOUNTER — OFFICE VISIT (OUTPATIENT)
Dept: ORTHOPEDICS | Facility: CLINIC | Age: 39
End: 2024-04-01
Payer: COMMERCIAL

## 2024-04-01 VITALS — HEIGHT: 65 IN | BODY MASS INDEX: 25.68 KG/M2 | WEIGHT: 154.13 LBS

## 2024-04-01 DIAGNOSIS — M25.512 CHRONIC LEFT SHOULDER PAIN: ICD-10-CM

## 2024-04-01 DIAGNOSIS — G89.29 CHRONIC LEFT SHOULDER PAIN: ICD-10-CM

## 2024-04-01 PROCEDURE — 3008F BODY MASS INDEX DOCD: CPT | Mod: CPTII,S$GLB,, | Performed by: ORTHOPAEDIC SURGERY

## 2024-04-01 PROCEDURE — 3052F HG A1C>EQUAL 8.0%<EQUAL 9.0%: CPT | Mod: CPTII,S$GLB,, | Performed by: ORTHOPAEDIC SURGERY

## 2024-04-01 PROCEDURE — 1159F MED LIST DOCD IN RCRD: CPT | Mod: CPTII,S$GLB,, | Performed by: ORTHOPAEDIC SURGERY

## 2024-04-01 PROCEDURE — 99999 PR PBB SHADOW E&M-EST. PATIENT-LVL IV: CPT | Mod: PBBFAC,,, | Performed by: ORTHOPAEDIC SURGERY

## 2024-04-01 PROCEDURE — 99204 OFFICE O/P NEW MOD 45 MIN: CPT | Mod: S$GLB,,, | Performed by: ORTHOPAEDIC SURGERY

## 2024-04-01 RX ORDER — MELOXICAM 15 MG/1
15 TABLET ORAL DAILY
Qty: 30 TABLET | Refills: 1 | Status: SHIPPED | OUTPATIENT
Start: 2024-04-01

## 2024-04-01 NOTE — PROGRESS NOTES
38 years old left shoulder pain for 3 months no trauma throbbing aching pain for on good days 10 on bad days worse with overhead activity reaching behind her back.  She is tried heat as well as ibuprofen with partial relief     Exam shows cervical motion does not reproduce her symptoms, positive Neer Meza impingement sign, passive motion is full, cuff strength weak and painful     MRI shows AC arthrosis, intratendinous degeneration of the rotator cuff partial-thickness tearing questionable labral degeneration    Assessment:  Left shoulder AC arthrosis impingement cuff tendinitis     Plan:  We offered the patient an injection she has not interested, we will prescribe Mobic, she is scheduled for physical therapy, follow-up in several weeks time as needed     Patient seen as a consult from Margi Braun, communication via [facility]

## 2024-04-02 ENCOUNTER — CLINICAL SUPPORT (OUTPATIENT)
Dept: REHABILITATION | Facility: HOSPITAL | Age: 39
End: 2024-04-02
Payer: COMMERCIAL

## 2024-04-02 DIAGNOSIS — M25.612 DECREASED RANGE OF MOTION OF LEFT SHOULDER: Primary | ICD-10-CM

## 2024-04-02 DIAGNOSIS — M62.81 MUSCLE WEAKNESS OF LEFT UPPER EXTREMITY: ICD-10-CM

## 2024-04-02 DIAGNOSIS — M53.84 DECREASED RANGE OF MOTION OF THORACIC SPINE: ICD-10-CM

## 2024-04-02 DIAGNOSIS — S46.812A TEAR OF LEFT INFRASPINATUS TENDON, INITIAL ENCOUNTER: ICD-10-CM

## 2024-04-02 PROCEDURE — 97530 THERAPEUTIC ACTIVITIES: CPT | Mod: PN

## 2024-04-02 PROCEDURE — 97140 MANUAL THERAPY 1/> REGIONS: CPT | Mod: PN

## 2024-04-02 PROCEDURE — 97162 PT EVAL MOD COMPLEX 30 MIN: CPT | Mod: PN

## 2024-04-02 NOTE — PLAN OF CARE
[facility]   Physical Therapy Initial Evaluation      Name: Marah Choudhury  [facility] Number: [id number]    Therapy Diagnosis:   Encounter Diagnoses   Name Primary?    Tear of left infraspinatus tendon, initial encounter     Decreased range of motion of left shoulder Yes    Decreased range of motion of thoracic spine     Muscle weakness of left upper extremity         Physician: Margi Braun,*    Physician Orders: PT Eval and Treat   Medical Diagnosis from Referral: S46.812A (ICD-10-CM) - Tear of left infraspinatus tendon, initial encounter   Evaluation Date: 4/2/2024  Authorization Period Expiration: 03/17/2025  Plan of Care Expiration: 06/02/2024  Progress Note Due: 05/02/2024  Visit # / Visits authorized: 1/ 1   FOTO: 1/3    Precautions: Standard and Type I Diabetes      Time In: 0801  Time Out: 0840  Total Appointment Time (timed & untimed codes): 39 minutes    Subjective     Date of onset: Around 3 months ago    History of current condition - Marah reports: She does not recall a specific mechanism of injury, however around 3 months ago she began having Left shoulder pain. She was playing volleyball with her son the day before, however she is Right handed and did not use her Left arm much. She reports simple activities such as dressing, cooking, and raising her arm overhead produce her symptoms. Reaching out to the side in external rotation is the hardest. She denies neck pain,  numbness/tingling, or radicular symptoms.     Falls: no    Imaging: MRI studies: Impression:     Acromioclavicular degenerative hypertrophy.     Glenohumeral effusion with intra tendinous degeneration in the distal supra humeral rotator cuff and partial-thickness tearing of the infraspinatus component..     Question of labral degeneration without definite tearing or displacement    Prior Therapy: not for this condition  Social History:  lives with their family  Occupation: , desk  job  Prior Level of Function: independent with all activities including gym routine   Current Level of Function: pain with reaching activities, household chores, lifting/carrying activities, dressing    Pain:  Current 3/10, worst 10/10, best 0/10   Location: left shoulder    Description: variable, hard to describe, it just hits her  Aggravating Factors: reaching, lifting, dressing,   Easing Factors: rest    Patients goals: to return to prior level of function and gym routine without increase in      Medical History:   Past Medical History:   Diagnosis Date    Vitamin D deficiency        Surgical History:   Marah Choudhury  has a past surgical history that includes Laser ablation; Tubal ligation; Ablation; and Breast biopsy (Left, 09/13/2022).    Medications:   Marah has a current medication list which includes the following prescription(s): alprazolam, bd ultra-fine mini pen needle, blood sugar diagnostic, dexcom g6 , dexcom g6 sensor, dexcom g6 transmitter, clindamycin, baqsimi, inpen (for humalog), insulin lispro, insulin lispro, inpen (for humalog) pink, omnipod 5 g6 intro kit (gen 5), omnipod 5 g6 pods (gen 5), meloxicam, and spironolactone.    Allergies:   Review of patient's allergies indicates:   Allergen Reactions    Lisinopril         Objective      Posture: downwardly rotated scapula bilaterally, internally rotated with anterior humeral glide on Left     Passive Range of Motion:   Shoulder Right Left   Flexion 165 165   ER at 90 100 90 painful   IR 60 40 painful      Active Range of Motion:   Shoulder Right Left   Flexion 165 135 painful   Abduction 165 135 painful   ER at 0 80 40   Functional IR  T7 L1     Strength:  Shoulder Right Left   Flexion 5/5 4+/5   Abduction 5/5 4+/5   External rotation at 90/90 5/5 4/5   Internal rotation at 90/90 5/5 -/5    Serratus Anterior 4-/5 3-/5   Middle Trap 4-/5 3-/5   Low Trap 4-/5 3-/5       Special Tests:   Right Left   Hawkin's Kenndy negative positive    Anterior Apprehension test negative Positive for pain but not apprehension   Relocation test negative Positive for decreased pain with end range External rotation        Joint Mobility: limited inferior and posterior humeral glide on Left     Flexibility: test at future visit              Treatment     Total Treatment time (time-based codes) separate from Evaluation: 18 minutes     Marah received the treatments listed below:      manual therapy techniques: Joint mobilizations were applied to the: Left shoulder for 8 minutes, including:    Inferior and posterior glide grade III-IV       therapeutic activities to improve functional performance for 10  minutes, including:    PT educated pt on condition, prognosis, and plan of care.     PT educated pt on and provided pt HEP consisting of:      Seated thoracic extension x10 with 5s hold  Prone Internal rotation with towel under anterior shoulder x10   External rotation isometric walkout with Red Theraband x10       Patient Education and Home Exercises     Education provided:   - Home Exercise Program to be performed twice daily     Written Home Exercises Provided: yes. Exercises were reviewed and Marah was able to demonstrate them prior to the end of the session.  Marah demonstrated good  understanding of the education provided. See EMR under Patient Instructions for exercises provided during therapy sessions.    Assessment     Marah is a 38 y.o. female referred to outpatient Physical Therapy with a medical diagnosis of tear of Left infraspinatus tendon. Patient presents with Left shoulder pain and demonstrates decreased Left shoulder Range of Motion, decreased thoracic mobility, and Left upper extremity weakness. Her symptoms and deficits are limiting her ability to perform activities of daily living, household chores, overhead reaching, and lifting/carrying activities.     Patient prognosis is Good.   Patient will benefit from skilled outpatient Physical Therapy to  address the deficits stated above and in the chart below, provide patient /family education, and to maximize patientt's level of independence.     Plan of care discussed with patient: Yes  Patient's spiritual, cultural and educational needs considered and patient is agreeable to the plan of care and goals as stated below:     Anticipated Barriers for therapy: none at this tie     Medical Necessity is demonstrated by the following  History  Co-morbidities and personal factors that may impact the plan of care [] LOW: no personal factors / co-morbidities  [x] MODERATE: 1-2 personal factors / co-morbidities  [] HIGH: 3+ personal factors / co-morbidities    Moderate / High Support Documentation:   Co-morbidities affecting plan of care: Type I Diabetes     Personal Factors:   no deficits     Examination  Body Structures and Functions, activity limitations and participation restrictions that may impact the plan of care [] LOW: addressing 1-2 elements  [x] MODERATE: 3+ elements  [] HIGH: 4+ elements (please support below)    Moderate / High Support Documentation: activities of daily living, household chores, overhead reaching, and lifting/carrying activities     Clinical Presentation [] LOW: stable  [x] MODERATE: Evolving  [] HIGH: Unstable     Decision Making/ Complexity Score: moderate       Goals:  Short Term Goals: 4 weeks   Pt will be IND with initial HEP to manage symptoms outside of PT.   Pt will report Left shoulder pain improved by >/= 50% with AROM to demonstrate improved condition.   Pt will improve MMT of upper extremity strength deficits by >/= 1/5 to improve tolerance for progressing rehab.   Pt will improve Left shoulder flexion A ROM by >= 15 degrees to improve tolerance for overhead reaching.     Long Term Goals: 6-8 weeks   Pt will improve FOTO score to >/= 69 to demonstrate improved functional mobility.  Pt will be IND with final HEP to maintain/improve strength and mobility gained in PT.   Pt will report  Left shoulder pain improved by >/= 100% with household chores to demonstrate improved condition.   Pt will improve MMT of upper extremity strength deficits to >/= 4+/ 5 to improve tolerance for lifting/carrying activities.   Pt will improve Left shoulder ROM = to uninvolved side to improve tolerance for overhead activities.   Pt goal:  Pt will report ability to return to her gym routine without increase in Left shoulder pain.  Plan     Plan of care Certification: 4/2/2024 to 06/02/2024.    Outpatient Physical Therapy 1-2 times weekly for 6 weeks to include the following interventions: Manual Therapy, Moist Heat/ Ice, Neuromuscular Re-ed, Patient Education, Therapeutic Activities, and Therapeutic Exercise.     Yaakov Lux, PT, DPT   Board Certified Clinical Specialist in Orthopedic Physical Therapy  Board Certified Clinical Specialist in Sports Physical Therapy

## 2024-04-12 ENCOUNTER — CLINICAL SUPPORT (OUTPATIENT)
Dept: REHABILITATION | Facility: HOSPITAL | Age: 39
End: 2024-04-12
Payer: COMMERCIAL

## 2024-04-12 DIAGNOSIS — M62.81 MUSCLE WEAKNESS OF LEFT UPPER EXTREMITY: ICD-10-CM

## 2024-04-12 DIAGNOSIS — M53.84 DECREASED RANGE OF MOTION OF THORACIC SPINE: ICD-10-CM

## 2024-04-12 DIAGNOSIS — M25.612 DECREASED RANGE OF MOTION OF LEFT SHOULDER: Primary | ICD-10-CM

## 2024-04-12 PROCEDURE — 97110 THERAPEUTIC EXERCISES: CPT | Mod: PN

## 2024-04-12 PROCEDURE — 97140 MANUAL THERAPY 1/> REGIONS: CPT | Mod: PN

## 2024-04-12 PROCEDURE — 97112 NEUROMUSCULAR REEDUCATION: CPT | Mod: PN

## 2024-04-12 PROCEDURE — 97530 THERAPEUTIC ACTIVITIES: CPT | Mod: PN

## 2024-04-12 NOTE — PROGRESS NOTES
[facility]   Physical Therapy Treatment Note     Name: Marah Choudhury  Clinic Number: [id number]    Therapy Diagnosis: No diagnosis found.  Physician: Margi Braun,*    Visit Date: 4/12/2024       Physician Orders: PT Eval and Treat   Medical Diagnosis from Referral: S46.812A (ICD-10-CM) - Tear of left infraspinatus tendon, initial encounter   Evaluation Date: 4/2/2024  Authorization Period Expiration: 03/17/2025  Plan of Care Expiration: 06/02/2024  Progress Note Due: 05/02/2024  Visit # / Visits authorized: 1/ 25  FOTO: 1/3     Precautions: Standard and Type I Diabetes      PTA Visit #: 0/5     FOTO first follow up:   FOTO second follow up:     Time In: 0805  Time Out: 0855  Total Billable Time: 45 minutes    SUBJECTIVE     Pt reports: this week was rough, had some pain with the HEP    She was compliant with home exercise program.  Response to previous treatment: first follow up   Functional change: first follow up     Pain: 2/10  Location: left shoulder      OBJECTIVE     Objective Measures updated at progress report unless specified.     Treatment     Marah received the treatments listed below:      therapeutic exercises to develop strength, endurance, and ROM for 15 minutes including:    Upper Body Ergometer 4'fwd/4'bwd   Seated thoracic extension x20 with 3s hold  Open books x20 each side     manual therapy techniques: Joint mobilizations were applied to the: Left shoulder for 8 minutes, including:    Inferior and posterior gh mobs grade III    neuromuscular re-education activities to improve: rotator cuff activation for 12 minutes. The following activities were included:    Prone Internal rotation with towel under anterior shoulder 3x12  External rotation/Internal rotation isometric walkouts with Red Theraband 3x10 with 5s hold each direction     therapeutic activities to improve functional performance for 10  minutes, including:    Wall slides 3x10   Seated serratus  landmines 3x10       Patient Education and Home Exercises     Home Exercises Provided and Patient Education Provided     Education provided:   - Continue Home Exercise Program     Written Home Exercises Provided: Patient instructed to cont prior HEP. Exercises were reviewed and Marah was able to demonstrate them prior to the end of the session.  Marah demonstrated good  understanding of the education provided. See EMR under Patient Instructions for exercises provided during therapy sessions    ASSESSMENT     Marah tolerated first follow up treatment well. Focus is on improving thoracic mobility, Left glenohumeral joint mobility, and shoulder stability. Pt fatigues with rotator cuff exercises. Will progress as tolerated.     Marah Is progressing well towards her goals.   Pt prognosis is Good.     Pt will continue to benefit from skilled outpatient physical therapy to address the deficits listed in the problem list box on initial evaluation, provide pt/family education and to maximize pt's level of independence in the home and community environment.     Pt's spiritual, cultural and educational needs considered and pt agreeable to plan of care and goals.     Anticipated barriers to physical therapy: none at this time     Goals:  Short Term Goals: 4 weeks   Pt will be IND with initial HEP to manage symptoms outside of PT.   Pt will report Left shoulder pain improved by >/= 50% with AROM to demonstrate improved condition.   Pt will improve MMT of upper extremity strength deficits by >/= 1/5 to improve tolerance for progressing rehab.   Pt will improve Left shoulder flexion A ROM by >= 15 degrees to improve tolerance for overhead reaching.      Long Term Goals: 6-8 weeks   Pt will improve FOTO score to >/= 69 to demonstrate improved functional mobility.  Pt will be IND with final HEP to maintain/improve strength and mobility gained in PT.   Pt will report Left shoulder pain improved by >/= 100% with household chores to  demonstrate improved condition.   Pt will improve MMT of upper extremity strength deficits to >/= 4+/ 5 to improve tolerance for lifting/carrying activities.   Pt will improve Left shoulder ROM = to uninvolved side to improve tolerance for overhead activities.   Pt goal:  Pt will report ability to return to her gym routine without increase in Left shoulder pain.  Plan      Plan of care Certification: 4/2/2024 to 06/02/2024.    Continue plan of care with focus on improving thoracic mobility, rotator cuff strength, and periscapular strength to offload     Yaakov Lux, PT, DPT   Board Certified Clinical Specialist in Orthopedic Physical Therapy  Board Certified Clinical Specialist in Sports Physical Therapy

## 2024-04-16 ENCOUNTER — LAB VISIT (OUTPATIENT)
Dept: LAB | Facility: HOSPITAL | Age: 39
End: 2024-04-16
Attending: NURSE PRACTITIONER
Payer: COMMERCIAL

## 2024-04-16 DIAGNOSIS — E10.65 UNCONTROLLED TYPE 1 DIABETES MELLITUS WITH HYPERGLYCEMIA: ICD-10-CM

## 2024-04-16 LAB
ALBUMIN SERPL BCP-MCNC: 3.4 G/DL (ref 3.5–5.2)
ALBUMIN/CREAT UR: 4.5 UG/MG (ref 0–30)
ALP SERPL-CCNC: 53 U/L (ref 55–135)
ALT SERPL W/O P-5'-P-CCNC: 16 U/L (ref 10–44)
ANION GAP SERPL CALC-SCNC: 5 MMOL/L (ref 8–16)
AST SERPL-CCNC: 17 U/L (ref 10–40)
BILIRUB SERPL-MCNC: 0.5 MG/DL (ref 0.1–1)
BUN SERPL-MCNC: 8 MG/DL (ref 6–20)
CALCIUM SERPL-MCNC: 8.8 MG/DL (ref 8.7–10.5)
CHLORIDE SERPL-SCNC: 105 MMOL/L (ref 95–110)
CHOLEST SERPL-MCNC: 189 MG/DL (ref 120–199)
CHOLEST/HDLC SERPL: 2.8 {RATIO} (ref 2–5)
CO2 SERPL-SCNC: 27 MMOL/L (ref 23–29)
CREAT SERPL-MCNC: 0.7 MG/DL (ref 0.5–1.4)
CREAT UR-MCNC: 111 MG/DL (ref 15–325)
EST. GFR  (NO RACE VARIABLE): >60 ML/MIN/1.73 M^2
ESTIMATED AVG GLUCOSE: 151 MG/DL (ref 68–131)
GLUCOSE SERPL-MCNC: 110 MG/DL (ref 70–110)
HBA1C MFR BLD: 6.9 % (ref 4–5.6)
HDLC SERPL-MCNC: 68 MG/DL (ref 40–75)
HDLC SERPL: 36 % (ref 20–50)
LDLC SERPL CALC-MCNC: 111.2 MG/DL (ref 63–159)
MICROALBUMIN UR DL<=1MG/L-MCNC: 5 UG/ML
NONHDLC SERPL-MCNC: 121 MG/DL
POTASSIUM SERPL-SCNC: 4.2 MMOL/L (ref 3.5–5.1)
PROT SERPL-MCNC: 6.3 G/DL (ref 6–8.4)
SODIUM SERPL-SCNC: 137 MMOL/L (ref 136–145)
TRIGL SERPL-MCNC: 49 MG/DL (ref 30–150)

## 2024-04-16 PROCEDURE — 82043 UR ALBUMIN QUANTITATIVE: CPT | Performed by: NURSE PRACTITIONER

## 2024-04-16 PROCEDURE — 83036 HEMOGLOBIN GLYCOSYLATED A1C: CPT | Performed by: NURSE PRACTITIONER

## 2024-04-16 PROCEDURE — 80053 COMPREHEN METABOLIC PANEL: CPT | Performed by: NURSE PRACTITIONER

## 2024-04-16 PROCEDURE — 80061 LIPID PANEL: CPT | Performed by: NURSE PRACTITIONER

## 2024-04-16 PROCEDURE — 36415 COLL VENOUS BLD VENIPUNCTURE: CPT | Mod: PO | Performed by: NURSE PRACTITIONER

## 2024-04-26 ENCOUNTER — CLINICAL SUPPORT (OUTPATIENT)
Dept: REHABILITATION | Facility: HOSPITAL | Age: 39
End: 2024-04-26
Payer: COMMERCIAL

## 2024-04-26 DIAGNOSIS — M53.84 DECREASED RANGE OF MOTION OF THORACIC SPINE: ICD-10-CM

## 2024-04-26 DIAGNOSIS — M25.612 DECREASED RANGE OF MOTION OF LEFT SHOULDER: Primary | ICD-10-CM

## 2024-04-26 DIAGNOSIS — M62.81 MUSCLE WEAKNESS OF LEFT UPPER EXTREMITY: ICD-10-CM

## 2024-04-26 PROCEDURE — 97110 THERAPEUTIC EXERCISES: CPT | Mod: PN

## 2024-04-26 PROCEDURE — 97530 THERAPEUTIC ACTIVITIES: CPT | Mod: PN

## 2024-04-26 PROCEDURE — 97112 NEUROMUSCULAR REEDUCATION: CPT | Mod: PN

## 2024-04-26 NOTE — PROGRESS NOTES
[facility]   Physical Therapy Treatment Note     Name: Marah Choudhury  Clinic Number: [id number]    Therapy Diagnosis: No diagnosis found.  Physician: Mragi Braun,*    Visit Date: 4/26/2024       Physician Orders: PT Eval and Treat   Medical Diagnosis from Referral: S46.812A (ICD-10-CM) - Tear of left infraspinatus tendon, initial encounter   Evaluation Date: 4/2/2024  Authorization Period Expiration: 03/17/2025  Plan of Care Expiration: 06/02/2024  Progress Note Due: 05/02/2024  Visit # / Visits authorized: 1/ 25  FOTO: 1/3     Precautions: Standard and Type I Diabetes      PTA Visit #: 0/5     FOTO first follow up:   FOTO second follow up:     Time In: 0803  Time Out: 0848  Total Billable Time: 45 minutes    SUBJECTIVE     Pt reports: this morning woke up with a little more pain    She was compliant with home exercise program.  Response to previous treatment: first follow up   Functional change: first follow up     Pain: 2/10  Location: left shoulder      OBJECTIVE     Objective Measures updated at progress report unless specified.     Treatment   PT extender available to assist with treatment as needed   Marah received the treatments listed below:      therapeutic exercises to develop strength, endurance, and ROM for 15 minutes including:    Upper Body Ergometer 4'fwd/4'bwd   Seated thoracic extension x20 with 3s hold  Open books x20 each side     manual therapy techniques: Joint mobilizations were applied to the: Left shoulder for 0 minutes, including:    Inferior and posterior gh mobs grade III    neuromuscular re-education activities to improve: rotator cuff activation for 23 minutes. The following activities were included:    Prone Internal rotation with towel under anterior shoulder 3x12  External rotation/Internal rotation isometric walkouts with Red Theraband 3x10 with 5s hold each direction   Prone T 3x10   Prone Y 3x10 pain free range     therapeutic activities  to improve functional performance for 10  minutes, including:    Wall slides 3x10   Seated serratus landmines 3x10   Shoulder flexion with red clx band 3x10     Patient Education and Home Exercises     Home Exercises Provided and Patient Education Provided     Education provided:   - Continue Home Exercise Program     Written Home Exercises Provided: Patient instructed to cont prior HEP. Exercises were reviewed and Marah was able to demonstrate them prior to the end of the session.  Marah demonstrated good  understanding of the education provided. See EMR under Patient Instructions for exercises provided during therapy sessions    ASSESSMENT     Marah continues to report Left shoulder pain with abduction and External rotation. Focus of rehab remains rotator cuff and periscapular strengthening to offload her shoulder. Will progress load as tolerated.     Marah Is progressing well towards her goals.   Pt prognosis is Good.     Pt will continue to benefit from skilled outpatient physical therapy to address the deficits listed in the problem list box on initial evaluation, provide pt/family education and to maximize pt's level of independence in the home and community environment.     Pt's spiritual, cultural and educational needs considered and pt agreeable to plan of care and goals.     Anticipated barriers to physical therapy: none at this time     Goals:  Short Term Goals: 4 weeks   Pt will be IND with initial HEP to manage symptoms outside of PT.   Pt will report Left shoulder pain improved by >/= 50% with AROM to demonstrate improved condition.   Pt will improve MMT of upper extremity strength deficits by >/= 1/5 to improve tolerance for progressing rehab.   Pt will improve Left shoulder flexion A ROM by >= 15 degrees to improve tolerance for overhead reaching.      Long Term Goals: 6-8 weeks   Pt will improve FOTO score to >/= 69 to demonstrate improved functional mobility.  Pt will be IND with final HEP to  maintain/improve strength and mobility gained in PT.   Pt will report Left shoulder pain improved by >/= 100% with household chores to demonstrate improved condition.   Pt will improve MMT of upper extremity strength deficits to >/= 4+/ 5 to improve tolerance for lifting/carrying activities.   Pt will improve Left shoulder ROM = to uninvolved side to improve tolerance for overhead activities.   Pt goal:  Pt will report ability to return to her gym routine without increase in Left shoulder pain.  Plan      Plan of care Certification: 4/2/2024 to 06/02/2024.    Continue plan of care with focus on improving thoracic mobility, rotator cuff strength, and periscapular strength to offload     Yaakov uLx, PT, DPT   Board Certified Clinical Specialist in Orthopedic Physical Therapy  Board Certified Clinical Specialist in Sports Physical Therapy

## 2024-05-09 ENCOUNTER — PATIENT MESSAGE (OUTPATIENT)
Dept: ENDOCRINOLOGY | Facility: CLINIC | Age: 39
End: 2024-05-09
Payer: COMMERCIAL

## 2024-05-09 ENCOUNTER — PATIENT MESSAGE (OUTPATIENT)
Dept: DIABETES | Facility: CLINIC | Age: 39
End: 2024-05-09
Payer: COMMERCIAL

## 2024-05-10 NOTE — PROGRESS NOTES
The patient location is: home  The chief complaint leading to consultation is: diabetes     Visit type: audiovisual    Face to Face time with patient:  14 mins  20  minutes of total time spent on the encounter, which includes face to face time and non-face to face time preparing to see the patient (eg, review of tests), Obtaining and/or reviewing separately obtained history, Documenting clinical information in the electronic or other health record, Independently interpreting results (not separately reported) and communicating results to the patient/family/caregiver, or Care coordination (not separately reported).     Each patient to whom he or she provides medical services by telemedicine is:  (1) informed of the relationship between the physician and patient and the respective role of any other health care provider with respect to management of the patient; and (2) notified that he or she may decline to receive medical services by telemedicine and may withdraw from such care at any time.    CC: This 38 y.o. female presents for management of diabetes, depression, vitamin d deficiency     HPI: She was diagnosed with T1DM at age 8 years. Hospitalized r/t DM at diagnosis  Family hx of DM: no one   Has two children 18 y/o daughter, 15 y/o son  - tubal ligation 2008 and ablation 2014     Sine last visit started Omni Pod % and uses Dexcom G6- see downloads in Media tab  Wearing Dexcom G6- see download in media tab  7% Very High  25% High  67% In Range  <1% Low  <1% Very Low  Has small pp excursions after some meals  Was having to eat a snack overnight to stop from going low, was only able to change her target to 130  Has reached out to education to help her complete  GMI:7.3%    Diet: Eats 2-3  Meals a day, snacks- nuts   Exercise: In PT for shoulder pain  CURRENT DM MEDS: Humalog in Omni Pod 5  Target 130  Basal  MN 0.75  0700  0.9  Carb ratio 6  ISF 40  Active Ins 3 hrs   Timing prandial insulin 5-15 minutes before  meals: yes  Glucometer type:  One Touch Ultra Mini       Standards of Care:  Eye exam:Jan 2024 Dr Mast      Works at a [employer]      Mom with hypothyroidism  Mom also with MI at age 37 years    ROS:   Gen: Appetite good   Eyes: Denies visual disturbances  Resp: no SOB; no POWELL  Cardiac: no palpitations  , no chest pain   GI: No nausea or vomiting, no diarrhea & constipation    /GYN: 2-3+ nocturia, no burning or pain.   MS/Neuro: Denies numbness/ tingling in BLE; Gait steady, speech clear  Psych: Denies drug/ETOH abuse, + h/o depression, anxiety   Other systems: negative.    PE:  GENERAL: Well developed, well nourished.  PSYCH: AAOx3, appropriate mood and affect, pleasant expression, conversant, appears relaxed, well groomed.   EYES: Conjunctiva, corneas clear  NECK: Supple, trachea midline  NEURO: Gait steady  SKIN:   no acanthosis nigracans.  FOOT EXAMINATION: 5/17/2023  No foot deformity, corns or callus formation,  nails in good condition and well trimmed, no interspace maceration or ulceration noted    Protective sensation intact with 10 gram monofilament.  +2 dorsalis pedis and posterior pulses noted.    Lab Results   Component Value Date    MICALBCREAT 4.5 04/16/2024       Hemoglobin A1C   Date Value Ref Range Status   04/16/2024 6.9 (H) 4.0 - 5.6 % Final     Comment:     ADA Screening Guidelines:  5.7-6.4%  Consistent with prediabetes  >or=6.5%  Consistent with diabetes    High levels of fetal hemoglobin interfere with the HbA1C  assay. Heterozygous hemoglobin variants (HbS, HgC, etc)do  not significantly interfere with this assay.   However, presence of multiple variants may affect accuracy.     01/11/2024 8.2 (H) 4.0 - 5.6 % Final     Comment:     ADA Screening Guidelines:  5.7-6.4%  Consistent with prediabetes  >or=6.5%  Consistent with diabetes    High levels of fetal hemoglobin interfere with the HbA1C  assay. Heterozygous hemoglobin variants (HbS, HgC, etc)do  not significantly interfere with this assay.    However, presence of multiple variants may affect accuracy.     05/09/2023 8.2 (H) 4.0 - 5.6 % Final     Comment:     ADA Screening Guidelines:  5.7-6.4%  Consistent with prediabetes  >or=6.5%  Consistent with diabetes    High levels of fetal hemoglobin interfere with the HbA1C  assay. Heterozygous hemoglobin variants (HbS, HgC, etc)do  not significantly interfere with this assay.   However, presence of multiple variants may affect accuracy.     08/16/2019 8.7 % Final        ASSESSMENT and PLAN:    1. T1DM with hyperglycemia-    She has left message with david to help make pump changes:  Midnight - 130  0600 - 110  2200 - 130   Has Toujeo at home if pump were to malfunction- Take   26u qd  Has Baqsimi @ home   (Discuss statin at next visit)    2. Depression & anxiety-  stable, chronic     3. [facility] cardiac dz- mom w MI at 37 yrs     4. Insulin pump adjustment as above     Follow-up:   in 3 months with A1C   (Please put Dexcom in- under Mraah Robert)

## 2024-05-13 ENCOUNTER — OFFICE VISIT (OUTPATIENT)
Dept: ENDOCRINOLOGY | Facility: CLINIC | Age: 39
End: 2024-05-13
Payer: COMMERCIAL

## 2024-05-13 DIAGNOSIS — E10.65 UNCONTROLLED TYPE 1 DIABETES MELLITUS WITH HYPERGLYCEMIA: Primary | ICD-10-CM

## 2024-05-13 DIAGNOSIS — Z82.49 FAMILY HISTORY OF HEART DISEASE: ICD-10-CM

## 2024-05-13 DIAGNOSIS — Z46.81 INSULIN PUMP FITTING OR ADJUSTMENT: ICD-10-CM

## 2024-05-13 DIAGNOSIS — K58.1 IRRITABLE BOWEL SYNDROME WITH CONSTIPATION: ICD-10-CM

## 2024-05-13 PROBLEM — E10.9 TYPE 1 DIABETES MELLITUS WITHOUT COMPLICATIONS: Status: RESOLVED | Noted: 2020-05-17 | Resolved: 2024-05-13

## 2024-05-13 PROBLEM — E55.9 VITAMIN D DEFICIENCY: Status: RESOLVED | Noted: 2017-06-06 | Resolved: 2024-05-13

## 2024-05-13 PROCEDURE — 3066F NEPHROPATHY DOC TX: CPT | Mod: CPTII,95,, | Performed by: NURSE PRACTITIONER

## 2024-05-13 PROCEDURE — 95251 CONT GLUC MNTR ANALYSIS I&R: CPT | Mod: NDTC,,, | Performed by: NURSE PRACTITIONER

## 2024-05-13 PROCEDURE — 3061F NEG MICROALBUMINURIA REV: CPT | Mod: CPTII,95,, | Performed by: NURSE PRACTITIONER

## 2024-05-13 PROCEDURE — 99214 OFFICE O/P EST MOD 30 MIN: CPT | Mod: 95,,, | Performed by: NURSE PRACTITIONER

## 2024-05-13 PROCEDURE — 3044F HG A1C LEVEL LT 7.0%: CPT | Mod: CPTII,95,, | Performed by: NURSE PRACTITIONER

## 2024-05-13 NOTE — TELEPHONE ENCOUNTER
Left message for patient on cell to assist with changing target glucose settings on Omnipod 5 pump. Office number provided to patient.

## 2024-05-16 ENCOUNTER — PATIENT MESSAGE (OUTPATIENT)
Dept: ENDOCRINOLOGY | Facility: CLINIC | Age: 39
End: 2024-05-16
Payer: COMMERCIAL

## 2024-08-08 ENCOUNTER — LAB VISIT (OUTPATIENT)
Dept: LAB | Facility: HOSPITAL | Age: 39
End: 2024-08-08
Attending: NURSE PRACTITIONER
Payer: COMMERCIAL

## 2024-08-08 DIAGNOSIS — E10.65 UNCONTROLLED TYPE 1 DIABETES MELLITUS WITH HYPERGLYCEMIA: ICD-10-CM

## 2024-08-08 LAB
ESTIMATED AVG GLUCOSE: 163 MG/DL (ref 68–131)
HBA1C MFR BLD: 7.3 % (ref 4–5.6)

## 2024-08-08 PROCEDURE — 83036 HEMOGLOBIN GLYCOSYLATED A1C: CPT | Performed by: NURSE PRACTITIONER

## 2024-08-08 PROCEDURE — 36415 COLL VENOUS BLD VENIPUNCTURE: CPT | Mod: PO | Performed by: NURSE PRACTITIONER

## 2024-08-28 ENCOUNTER — OFFICE VISIT (OUTPATIENT)
Dept: ENDOCRINOLOGY | Facility: CLINIC | Age: 39
End: 2024-08-28
Payer: COMMERCIAL

## 2024-08-28 VITALS
HEIGHT: 65 IN | BODY MASS INDEX: 25.58 KG/M2 | WEIGHT: 153.56 LBS | SYSTOLIC BLOOD PRESSURE: 112 MMHG | OXYGEN SATURATION: 95 % | HEART RATE: 73 BPM | DIASTOLIC BLOOD PRESSURE: 76 MMHG

## 2024-08-28 DIAGNOSIS — Z96.41 INSULIN PUMP IN PLACE: ICD-10-CM

## 2024-08-28 DIAGNOSIS — E10.65 UNCONTROLLED TYPE 1 DIABETES MELLITUS WITH HYPERGLYCEMIA: Primary | ICD-10-CM

## 2024-08-28 PROCEDURE — 3074F SYST BP LT 130 MM HG: CPT | Mod: CPTII,S$GLB,, | Performed by: NURSE PRACTITIONER

## 2024-08-28 PROCEDURE — 3078F DIAST BP <80 MM HG: CPT | Mod: CPTII,S$GLB,, | Performed by: NURSE PRACTITIONER

## 2024-08-28 PROCEDURE — 99213 OFFICE O/P EST LOW 20 MIN: CPT | Mod: S$GLB,,, | Performed by: NURSE PRACTITIONER

## 2024-08-28 PROCEDURE — 3066F NEPHROPATHY DOC TX: CPT | Mod: CPTII,S$GLB,, | Performed by: NURSE PRACTITIONER

## 2024-08-28 PROCEDURE — 3051F HG A1C>EQUAL 7.0%<8.0%: CPT | Mod: CPTII,S$GLB,, | Performed by: NURSE PRACTITIONER

## 2024-08-28 PROCEDURE — 1159F MED LIST DOCD IN RCRD: CPT | Mod: CPTII,S$GLB,, | Performed by: NURSE PRACTITIONER

## 2024-08-28 PROCEDURE — 95251 CONT GLUC MNTR ANALYSIS I&R: CPT | Mod: S$GLB,,, | Performed by: NURSE PRACTITIONER

## 2024-08-28 PROCEDURE — 3061F NEG MICROALBUMINURIA REV: CPT | Mod: CPTII,S$GLB,, | Performed by: NURSE PRACTITIONER

## 2024-08-28 PROCEDURE — 3008F BODY MASS INDEX DOCD: CPT | Mod: CPTII,S$GLB,, | Performed by: NURSE PRACTITIONER

## 2024-08-28 RX ORDER — PRAVASTATIN SODIUM 10 MG/1
10 TABLET ORAL DAILY
Qty: 90 TABLET | Refills: 3 | Status: SHIPPED | OUTPATIENT
Start: 2024-08-28 | End: 2025-08-28

## 2024-08-28 RX ORDER — GLUCAGON 3 MG/1
POWDER NASAL
Qty: 1 EACH | Refills: 1 | Status: SHIPPED | OUTPATIENT
Start: 2024-08-28

## 2024-08-28 RX ORDER — INSULIN GLARGINE 300 [IU]/ML
INJECTION, SOLUTION SUBCUTANEOUS
Start: 2024-08-28

## 2024-08-28 NOTE — PROGRESS NOTES
CC: This 39 y.o. female presents for management of diabetes, depression, vitamin d deficiency     HPI: She was diagnosed with T1DM at age 8 years. Hospitalized r/t DM at diagnosis  Family hx of DM: no one   Has two children 18 y/o daughter, 13 y/o son  - tubal ligation 2008 and ablation 2014     Had Covid twice this past summer  Continues on Omni Pod 5 with Dexcom G6- see downloads in Media tab  Wearing Dexcom G6- see download in media tab  4% Very High  25% High  71% In Range  0% Low  <1% Very Low  GMI:7.2%  Prandial excursions after lunch daily - Come down within 90 mins  Otherwise bg look great!   TDD 32  Diet: Eats 2-3  Meals a day, snacks- nuts, pepperoni   Exercise: resistance training w bands; 3 days a week    CURRENT DM MEDS: Humalog in Omni Pod 5  Basal  MN 0.75  0700  0.9  Carb ratio 6  ISF 40  Midnight - 130  0600 - 110  2200 - 130   Active Ins 3 hrs   Timing prandial insulin 5-15 minutes before meals: yes  Glucometer type:  One Touch Ultra Mini       Standards of Care:  Eye exam:Jan 2024 Dr Mast      Works at a bank      Mom with hypothyroidism  Mom also with MI at age 37 years    ROS:   Gen: Appetite good   Eyes: Denies visual disturbances  Resp: no SOB; no POWELL  Cardiac: no palpitations  , no chest pain   GI: No nausea or vomiting, no diarrhea & constipation    /GYN: 2-3+ nocturia, no burning or pain.   MS/Neuro: Denies numbness/ tingling in BLE; Gait steady, speech clear  Psych: Denies drug/ETOH abuse, + h/o depression, anxiety   Other systems: negative.    PE:  GENERAL: Well developed, well nourished.  PSYCH: AAOx3, appropriate mood and affect, pleasant expression, conversant, appears relaxed, well groomed.   EYES: Conjunctiva, corneas clear  NECK: Supple, trachea midline  NEURO: Gait steady  SKIN:   no acanthosis nigracans.  FOOT EXAMINATION: 8/28/2024  No foot deformity, corns or callus formation,  nails in good condition and well trimmed, no interspace maceration or ulceration noted     Protective sensation intact with 10 gram monofilament.  +2 dorsalis pedis and posterior pulses noted.    Lab Results   Component Value Date    MICALBCREAT 4.5 04/16/2024       Hemoglobin A1C   Date Value Ref Range Status   08/08/2024 7.3 (H) 4.0 - 5.6 % Final     Comment:     ADA Screening Guidelines:  5.7-6.4%  Consistent with prediabetes  >or=6.5%  Consistent with diabetes    High levels of fetal hemoglobin interfere with the HbA1C  assay. Heterozygous hemoglobin variants (HbS, HgC, etc)do  not significantly interfere with this assay.   However, presence of multiple variants may affect accuracy.     04/16/2024 6.9 (H) 4.0 - 5.6 % Final     Comment:     ADA Screening Guidelines:  5.7-6.4%  Consistent with prediabetes  >or=6.5%  Consistent with diabetes    High levels of fetal hemoglobin interfere with the HbA1C  assay. Heterozygous hemoglobin variants (HbS, HgC, etc)do  not significantly interfere with this assay.   However, presence of multiple variants may affect accuracy.     01/11/2024 8.2 (H) 4.0 - 5.6 % Final     Comment:     ADA Screening Guidelines:  5.7-6.4%  Consistent with prediabetes  >or=6.5%  Consistent with diabetes    High levels of fetal hemoglobin interfere with the HbA1C  assay. Heterozygous hemoglobin variants (HbS, HgC, etc)do  not significantly interfere with this assay.   However, presence of multiple variants may affect accuracy.     08/16/2019 8.7 % Final        ASSESSMENT and PLAN:    1. T1DM with hyperglycemia-    No pump changes, bg are well controlled   Start Statin for CV protection   Has Toujeo at home if pump were to malfunction- Take 26u qd  New Rx for Baqsimi       2. Depression & anxiety-  stable, chronic     3. [facility] cardiac dz- mom w MI at 37 yrs     4. Insulin pump  as above     Follow-up:   in 4 months with A1C   (Please put Dexcom in- under Marah Robert)

## 2024-12-01 DIAGNOSIS — E10.65 UNCONTROLLED TYPE 1 DIABETES MELLITUS WITH HYPERGLYCEMIA: ICD-10-CM

## 2024-12-02 RX ORDER — BLOOD-GLUCOSE SENSOR
EACH MISCELLANEOUS
Qty: 3 EACH | Refills: 11 | Status: SHIPPED | OUTPATIENT
Start: 2024-12-02

## 2025-02-14 DIAGNOSIS — E10.65 UNCONTROLLED TYPE 1 DIABETES MELLITUS WITH HYPERGLYCEMIA: ICD-10-CM

## 2025-02-14 RX ORDER — INSULIN LISPRO 100 [IU]/ML
INJECTION, SOLUTION INTRAVENOUS; SUBCUTANEOUS
Qty: 30 ML | Refills: 11 | Status: SHIPPED | OUTPATIENT
Start: 2025-02-14

## 2025-03-18 DIAGNOSIS — E10.65 UNCONTROLLED TYPE 1 DIABETES MELLITUS WITH HYPERGLYCEMIA: ICD-10-CM

## 2025-03-19 ENCOUNTER — PATIENT MESSAGE (OUTPATIENT)
Dept: ENDOCRINOLOGY | Facility: CLINIC | Age: 40
End: 2025-03-19
Payer: COMMERCIAL

## 2025-03-19 DIAGNOSIS — E10.65 UNCONTROLLED TYPE 1 DIABETES MELLITUS WITH HYPERGLYCEMIA: ICD-10-CM

## 2025-03-19 RX ORDER — INSULIN PMP CART,AUT,G6/7,CNTR
1 EACH SUBCUTANEOUS
Qty: 70 EACH | Refills: 0 | Status: SHIPPED | OUTPATIENT
Start: 2025-03-19

## 2025-03-21 RX ORDER — INSULIN LISPRO 100 [IU]/ML
INJECTION, SOLUTION INTRAVENOUS; SUBCUTANEOUS
Qty: 15 ML | Refills: 12 | OUTPATIENT
Start: 2025-03-21

## 2025-03-21 RX ORDER — INSULIN LISPRO 100 [IU]/ML
INJECTION, SOLUTION INTRAVENOUS; SUBCUTANEOUS
Qty: 30 ML | Refills: 11 | Status: SHIPPED | OUTPATIENT
Start: 2025-03-21

## 2025-04-23 DIAGNOSIS — E10.65 UNCONTROLLED TYPE 1 DIABETES MELLITUS WITH HYPERGLYCEMIA: ICD-10-CM

## 2025-04-24 RX ORDER — BLOOD-GLUCOSE TRANSMITTER
EACH MISCELLANEOUS
Qty: 1 EACH | Refills: 3 | Status: SHIPPED | OUTPATIENT
Start: 2025-04-24

## 2025-05-19 ENCOUNTER — PATIENT MESSAGE (OUTPATIENT)
Dept: ENDOCRINOLOGY | Facility: CLINIC | Age: 40
End: 2025-05-19
Payer: COMMERCIAL

## 2025-05-20 DIAGNOSIS — E10.65 UNCONTROLLED TYPE 1 DIABETES MELLITUS WITH HYPERGLYCEMIA: Primary | ICD-10-CM

## 2025-07-08 LAB
PAP RECOMMENDATION EXT: NORMAL
PAP SMEAR: NORMAL

## 2025-08-19 ENCOUNTER — PATIENT MESSAGE (OUTPATIENT)
Dept: ADMINISTRATIVE | Facility: HOSPITAL | Age: 40
End: 2025-08-19
Payer: COMMERCIAL

## 2025-08-20 LAB
LEFT EYE DM RETINOPATHY: NEGATIVE
RIGHT EYE DM RETINOPATHY: NEGATIVE

## 2025-08-26 ENCOUNTER — PATIENT OUTREACH (OUTPATIENT)
Dept: ADMINISTRATIVE | Facility: HOSPITAL | Age: 40
End: 2025-08-26
Payer: COMMERCIAL

## 2025-09-05 ENCOUNTER — OFFICE VISIT (OUTPATIENT)
Dept: FAMILY MEDICINE | Facility: CLINIC | Age: 40
End: 2025-09-05
Payer: COMMERCIAL

## 2025-09-05 VITALS
DIASTOLIC BLOOD PRESSURE: 80 MMHG | SYSTOLIC BLOOD PRESSURE: 124 MMHG | HEART RATE: 81 BPM | OXYGEN SATURATION: 98 % | HEIGHT: 65 IN | WEIGHT: 153.25 LBS | BODY MASS INDEX: 25.53 KG/M2 | TEMPERATURE: 99 F

## 2025-09-05 DIAGNOSIS — Z00.00 PREVENTATIVE HEALTH CARE: Primary | ICD-10-CM

## 2025-09-05 DIAGNOSIS — F41.1 GAD (GENERALIZED ANXIETY DISORDER): ICD-10-CM

## 2025-09-05 DIAGNOSIS — K59.09 CHRONIC CONSTIPATION: ICD-10-CM

## 2025-09-05 DIAGNOSIS — E10.65 UNCONTROLLED TYPE 1 DIABETES MELLITUS WITH HYPERGLYCEMIA: ICD-10-CM

## 2025-09-05 DIAGNOSIS — F41.0 PANIC ATTACKS: ICD-10-CM

## 2025-09-05 PROCEDURE — 99999 PR PBB SHADOW E&M-EST. PATIENT-LVL III: CPT | Mod: PBBFAC,,, | Performed by: NURSE PRACTITIONER

## 2025-09-05 RX ORDER — ALPRAZOLAM 0.25 MG/1
0.25 TABLET ORAL 2 TIMES DAILY PRN
Qty: 30 TABLET | Refills: 0 | Status: SHIPPED | OUTPATIENT
Start: 2025-09-05 | End: 2025-10-05